# Patient Record
Sex: MALE | Race: WHITE
[De-identification: names, ages, dates, MRNs, and addresses within clinical notes are randomized per-mention and may not be internally consistent; named-entity substitution may affect disease eponyms.]

---

## 2018-10-15 ENCOUNTER — HOSPITAL ENCOUNTER (OUTPATIENT)
Dept: HOSPITAL 74 - JASU-SURG | Age: 48
Discharge: HOME | End: 2018-10-15
Attending: SURGERY
Payer: COMMERCIAL

## 2018-10-15 VITALS — SYSTOLIC BLOOD PRESSURE: 110 MMHG | DIASTOLIC BLOOD PRESSURE: 71 MMHG | HEART RATE: 60 BPM | TEMPERATURE: 98.4 F

## 2018-10-15 VITALS — BODY MASS INDEX: 33.7 KG/M2

## 2018-10-15 DIAGNOSIS — L72.0: Primary | ICD-10-CM

## 2018-10-15 DIAGNOSIS — E11.9: ICD-10-CM

## 2018-10-15 DIAGNOSIS — I10: ICD-10-CM

## 2018-10-15 DIAGNOSIS — Z79.84: ICD-10-CM

## 2018-10-15 PROCEDURE — 0JB40ZZ EXCISION OF RIGHT NECK SUBCUTANEOUS TISSUE AND FASCIA, OPEN APPROACH: ICD-10-PCS | Performed by: SURGERY

## 2018-10-15 NOTE — OP
DATE OF OPERATION:  10/15/2018

 

SURGICAL ATTENDING:  Ignacio Lynne MD 

 

PREOPERATIVE DIAGNOSIS:  Right posterolateral neck mass.

 

POSTOPERATIVE DIAGNOSIS:  Right posterolateral neck mass.

 

ANESTHESIA:  Local with sedation.

 

PROCEDURE:  Excision of right neck mass.

 

DESCRIPTION OF PROCEDURE:  The patient was taken into the operating room and placed

in a supine position, given IV sedation, IV antibiotics, prepped and draped in the

usual sterile fashion.  The right posterolateral neck mass was identified.  Local

anesthesia was injected.  An elliptical incision including the skin overlying the

surrounding the neck mass was excised down to subcutaneous tissues and muscle.  The

accessory nerve was identified and preserved.  Hemostasis was achieved with

electrocautery.  The wound was then closed in 2 layers.  Sterile dressings and

Dermabond were placed.  The patient was then extubated, awakened, and taken to

recovery in stable condition.  

 

Dr. Lynne, the attending surgeon, was present throughout the entire procedure.

 

 

 

IGNACIO LYNNE M.D.

 

[id number]

DD: 10/15/2018 09:56

DT: 10/15/2018 14:32

Job #:  [id number]

## 2018-10-16 NOTE — PATH
Surgical Pathology Report



Patient Name:  LISE BUNCH

Accession #:  [id number]

Med. Rec. #:  [id number]                                                        

   /Age/Gender:  1970 (Age: 48) / M

Account:  [id number]                                                          

             Location: [facility]

Taken:  10/15/2018

Received:  10/15/2018

Reported:  10/16/2018

Physicians:  Toro Felix M.D.

  



Specimen(s) Received

 RIGHT LATERAL NECK MASS 





Clinical History

Right lateral neck mass







Final Diagnosis

MASS, LATERAL NECK, RIGHT, EXCISION:

RUPTURED AND INFLAMED EPIDERMAL INCLUSION CYST.





***Electronically Signed***

Dora Parker M.D.





Gross Description

Received in formalin labeled "right lateral neck mass" is a skin ellipse and

underlying subcutaneous tissue which measures 4.5 x 2 cm, excised to a depth of

2 cm. The specimen is undesignated. Surgical margins are inked in blue. Skin

surface shows a 0.2 cm papular lesion.  Which on cut section, connects to a 1 x

0.3 cm variegated, white-tan, firm, lesion with surrounding erythema. The lesion

abuts the inked radial margin and 1.5 cm from the deep margin. The entire lesion

and tips are submitted in 6 cassettes as follows: 1-5- lesion, 6- tips.

MLSZ/10/15/2018



sanml/10/15/2018

## 2018-11-20 PROBLEM — Z00.00 ENCOUNTER FOR PREVENTIVE HEALTH EXAMINATION: Status: ACTIVE | Noted: 2018-11-20

## 2019-02-25 ENCOUNTER — RECORD ABSTRACTING (OUTPATIENT)
Age: 49
End: 2019-02-25

## 2019-02-25 DIAGNOSIS — F17.200 NICOTINE DEPENDENCE, UNSPECIFIED, UNCOMPLICATED: ICD-10-CM

## 2019-02-25 DIAGNOSIS — Z87.442 PERSONAL HISTORY OF URINARY CALCULI: ICD-10-CM

## 2019-02-25 DIAGNOSIS — Z86.39 PERSONAL HISTORY OF OTHER ENDOCRINE, NUTRITIONAL AND METABOLIC DISEASE: ICD-10-CM

## 2019-02-25 DIAGNOSIS — R73.03 PREDIABETES.: ICD-10-CM

## 2019-03-08 ENCOUNTER — MEDICATION RENEWAL (OUTPATIENT)
Age: 49
End: 2019-03-08

## 2019-03-19 ENCOUNTER — APPOINTMENT (OUTPATIENT)
Dept: ENDOCRINOLOGY | Facility: CLINIC | Age: 49
End: 2019-03-19

## 2019-05-21 ENCOUNTER — APPOINTMENT (OUTPATIENT)
Dept: ENDOCRINOLOGY | Facility: CLINIC | Age: 49
End: 2019-05-21
Payer: COMMERCIAL

## 2019-05-21 VITALS
HEART RATE: 70 BPM | WEIGHT: 245 LBS | BODY MASS INDEX: 34.3 KG/M2 | SYSTOLIC BLOOD PRESSURE: 120 MMHG | HEIGHT: 71 IN | DIASTOLIC BLOOD PRESSURE: 78 MMHG

## 2019-05-21 PROCEDURE — 99213 OFFICE O/P EST LOW 20 MIN: CPT

## 2019-05-22 NOTE — HISTORY OF PRESENT ILLNESS
[FreeTextEntry1] : \par May 21, 2019\par \par PCP: Dr. Meng Ro: [facility] \par             [address], [place] [address]\par             [phone] Fax \par            .\par             Cardiology: Dr. Bo Mendez p [phone]\par             f [phone]\par             and now Dr. Regan Hamilton p  \par             f: [phone]\par             on [facility]. \par             Eyes: Dr. Amado Curran p \par             f  \par             Pod: Dr. Mitesh Vera on [address]  p\par             sees every 6 weeks\par             ENT: Dr. Mekhi Dhillon 9/20/18 R neck mass \par            .\par            CC: Diabetes ~ 2015 A1c 7.1-7.6 % (did not tolerate glipizide)\par             (born with decreased hearing) \par             MI: 2/4/2016 - presented with sweating and weakness -> SJR\par             -smokes 2 cigars/week \par             Elevated PTH (167 2/20/18, 25 OH vit D low) \par             , borderline elev calcium Hx kidney stone\par            .\par Brings his One Touch Verio.  He checks mostly fasting BS a few times a week, a few after noon BS (much higher).\par I gave him CGM:  Freestyle Lily 14 which he was able to use for 11 of 14 days.    He generally checked his sugar  once, occasionally twice a day even with the Lily, so there are gaps in the graphical information  On the 11th day of 14 the Lily got knocked off and he said he does not want to use it going into the future.   However, the Lily nicely documented marked excursion in his blood sugar after meals, a time missed on the fingerstick data.  \par \par August - Sept 4 he will be on a "boatel" in [place].   \par ROV here in October.   \par \par He reports that Dr. Malin would like him to try an SGLT-2 inhibitor, Jardiance. Requested and he has been instructed in its use, warned of potential side effects and importance of extra fluid intake.   \par \par \par \par \par \par \par Previous notes from [employer] appended below.\par \par November 12, 2018\par            .\par            PCP: Dr. Meng Ro: [facility] \par             [address], [place] [address]\par             [phone] Fax \par            .\par             Cardiology: Dr. Bo Mendez p [phone]\par             f [phone]\par             and now Dr. Regan Hamilton p  \par             f: [phone]\par             on [facility]. \par             Eyes: Dr. Amado Curran p \par             f  \par             Pod: Dr. Mitesh Vera on [address]  p\par             sees every 6 weeks\par             ENT: Dr. Mekhi Dhillon 9/20/18 R neck mass \par            .\par            CC: Diabetes ~ 2015 A1c 7.1-7.6 % (did not claude glipizide)\par             (born with decreased hearing) \par             MI: 2/4/2016 - presented with sweating and weakness -> SJR\par             -smokes 2 cigars/week \par             Elevated PTH (167 2/20/18, 25 OH vit D low) \par             , borderline elev calcium Hx kidney stone\par            .\par            49 yo with decreased hearing visits for diabetes, low vitamin D,\par            early hyperparathyroidism.\par            Just returned from a trip to [place].\par            Did a lot of fishing.\par            [place]s [place] [employer] [place].\par            Forget his blood glucose meter today. \par            .\par            lab tests from\par            6/29/2018 (last visit) included\par            25 OH vit D 19.9\par            ionized calcium 1.34 (1.12 - 1/30)\par            total calcium 10.0\par            glucose 142 mg/dl \par            HbA1c 6.2 % (had been 6.3 in February)\par            phos 3.5\par            .\par            Good eye report from January - Dr. Farr.\par            .\par            September saw Dr. Tariq Dhillon for R neck mass (infectious)\par            .\par            Medications include\par            Plavix 75 mg\par            glyburide-metformin 2l5 500 BID\par            lisinopril 5 mg daily\par            metoprolol 25 mg BID\par            simvastain 40 mg daily\par            .\par            FBS today 186 after pizza last night. \par            Rest of the Verio readings are mostly reasonable.\par            .\par            Plan: Updated labs today.\par            Discussed Freestyle Lily 14 sensors and reader and he will inquire at his local [facility].\par            Take OTC vitamin D 1000 iu daily.\par            Bring his wife to next visiti in March\par            ==\par

## 2019-05-22 NOTE — ASSESSMENT
[FreeTextEntry1] : ~\par Fingerstick blood sugar data from fasting state in reasonable range.\par The occasional afternoon sugars are much higher.\par He did not like using the Freestyle Lily, but for 11 days some useful data was obtained.\par He will be in [place] for a good period of time on a "boatel".\par Will see him on his return.

## 2019-10-14 ENCOUNTER — APPOINTMENT (OUTPATIENT)
Dept: ENDOCRINOLOGY | Facility: CLINIC | Age: 49
End: 2019-10-14
Payer: COMMERCIAL

## 2019-10-14 VITALS
SYSTOLIC BLOOD PRESSURE: 120 MMHG | WEIGHT: 238 LBS | DIASTOLIC BLOOD PRESSURE: 80 MMHG | HEART RATE: 57 BPM | HEIGHT: 71 IN | BODY MASS INDEX: 33.32 KG/M2

## 2019-10-14 PROCEDURE — 36415 COLL VENOUS BLD VENIPUNCTURE: CPT

## 2019-10-14 PROCEDURE — 99214 OFFICE O/P EST MOD 30 MIN: CPT | Mod: 25

## 2019-10-14 RX ORDER — GLIPIZIDE 10 MG/1
10 TABLET, FILM COATED, EXTENDED RELEASE ORAL
Refills: 0 | Status: DISCONTINUED | COMMUNITY
End: 2019-10-14

## 2019-10-14 NOTE — HISTORY OF PRESENT ILLNESS
[FreeTextEntry1] : Oct 14, 2019\par \par PCP: Dr. Meng Ro: [facility] \par             [address], [place] [address]   [phone] Fax \par            .\par             Cardiology: Dr. Bo Mendez p [phone]    f [phone]\par             and now Dr. Regan Hamilton p   f: [phone]  on [facility]. \par             Eyes: Dr. Amado Curran p       f  \par             Pod: Dr. Mitesh Vera on [address]  p   sees every 6 weeks\par             ENT: Dr. Mekhi Dhillon 9/20/18 R neck mass \par            .\par            CC: Diabetes ~ 2015 A1c 7.1-7.6 % (did not tolerate glipizide)   11/12/2018 A1c 6.9%\par             (born with decreased hearing) \par             MI: 2/4/2016 - presented with sweating and weakness -> SJR\par             -smokes 2 cigars/week \par             Elevated PTH (167 2/20/18, 25 OH vit D low = 17.9 11/12/2018  ) \par             , borderline elev calcium Hx kidney stone\par            .\par "I had too much to eat yesterday, bar-b-cue....\par My wife would like me to get the Freestyle Lily to monitor the dog...."\par He previously tried the Lily and returned it as he did not like it.\par Briings in the One Touch Verio \par FBS today 180 mg/dl\par yesterday \par day before FBS 80\par July 27 5 pm 188 mg/dl   \par \par Taking Jardiance.\par Not sure of other medications.\par Recent labs by Dr. Contreras.  \par \par \par May 21, 2019\par \par PCP: Dr. Meng Ro: [facility] \par             [address], [place] [address]\par             [phone] Fax \par            .\par             Cardiology: Dr. Bo Mnedez p [phone]\par             f [phone]\par             and now Dr. Regan Hamilton p  \par             f: [phone]\par             on [facility]. \par             Eyes: Dr. Amado Curran p \par             f  \par             Pod: Dr. Mitesh Vera on [address]  p\par             sees every 6 weeks\par             ENT: Dr. Mekhi Dhillon 9/20/18 R neck mass \par            .\par            CC: Diabetes ~ 2015 A1c 7.1-7.6 % (did not tolerate glipizide)\par             (born with decreased hearing) \par             MI: 2/4/2016 - presented with sweating and weakness -> SJR\par             -smokes 2 cigars/week \par             Elevated PTH (167 2/20/18, 25 OH vit D low) \par             , borderline elev calcium Hx kidney stone\par            .\par Brings his One Touch Verio.  He checks mostly fasting BS a few times a week, a few after noon BS (much higher).\par I gave him CGM:  Freestyle Lily 14 which he was able to use for 11 of 14 days.    He generally checked his sugar  once, occasionally twice a day even with the Lily, so there are gaps in the graphical information  On the 11th day of 14 the Lily got knocked off and he said he does not want to use it going into the future.   However, the Lily nicely documented marked excursion in his blood sugar after meals, a time missed on the fingerstick data.  \par \par August - Sept 4 he will be on a "boatel" in [place].   \par ROV here in October.   \par \par He reports that Dr. Malin would like him to try an SGLT-2 inhibitor, Jardiance. Requested and he has been instructed in its use, warned of potential side effects and importance of extra fluid intake.   \par \par \par \par \par \par \par Previous notes from [employer] appended below.\par \par November 12, 2018\par            .\par            PCP: Dr. Meng Ro: [facility] \par             [address], [place] [address]\par             [phone] Fax \par            .\par             Cardiology: Dr. Bo Mendez p [phone]\par             f [phone]\par             and now Dr. Regan Hamilton p  \par             f: [phone]\par             on [facility]. \par             Eyes: Dr. Amado Curran p \par             f  \par             Pod: Dr. Mitesh Vera on [address]  p\par             sees every 6 weeks\par             ENT: Dr. Mekhi Dhillon 9/20/18 R neck mass \par            .\par            CC: Diabetes ~ 2015 A1c 7.1-7.6 % (did not claude glipizide)\par             (born with decreased hearing) \par             MI: 2/4/2016 - presented with sweating and weakness -> SJR\par             -smokes 2 cigars/week \par             Elevated PTH (167 2/20/18, 25 OH vit D low) \par             , borderline elev calcium Hx kidney stone\par            .\par            49 yo with decreased hearing visits for diabetes, low vitamin D,\par            early hyperparathyroidism.\par            Just returned from a trip to [place].\par            Did a lot of fishing.\par            [place] [employer] [place].\par            Forget his blood glucose meter today. \par            .\par            lab tests from\par            6/29/2018 (last visit) included\par            25 OH vit D 19.9\par            ionized calcium 1.34 (1.12 - 1/30)\par            total calcium 10.0\par            glucose 142 mg/dl \par            HbA1c 6.2 % (had been 6.3 in February)\par            phos 3.5\par            .\par            Good eye report from January - Dr. Farr.\par            .\par            September saw Dr. Tariq Dhillon for R neck mass (infectious)\par            .\par            Medications include\par            Plavix 75 mg\par            glyburide-metformin 2l5 500 BID\par            lisinopril 5 mg daily\par            metoprolol 25 mg BID\par            simvastain 40 mg daily\par            .\par            FBS today 186 after pizza last night. \par            Rest of the Verio readings are mostly reasonable.\par            .\par            Plan: Updated labs today.\par            Discussed Freestyle Lily 14 sensors and reader and he will [facility].\par            Take OTC vitamin D 1000 iu daily.\par            Bring his wife to next visiti in March\par            ==\par

## 2019-10-14 NOTE — ASSESSMENT
[FreeTextEntry1] : &\par Updated calcium, PTH, vit D and A1c today\par ROV February\par Bring all medications to next visit.\par Bring his wife to next visit.\par Do testing after meals.\par Has not been to ophthalmology yet this year - reminded.

## 2019-10-19 LAB
25(OH)D3 SERPL-MCNC: 26.6 NG/ML
ANION GAP SERPL CALC-SCNC: 13 MMOL/L
BUN SERPL-MCNC: 18 MG/DL
CALCIUM SERPL-MCNC: 10.1 MG/DL
CALCIUM SERPL-MCNC: 10.1 MG/DL
CHLORIDE SERPL-SCNC: 106 MMOL/L
CO2 SERPL-SCNC: 25 MMOL/L
CREAT SERPL-MCNC: 0.79 MG/DL
ESTIMATED AVERAGE GLUCOSE: 137 MG/DL
FRUCTOSAMINE SERPL-MCNC: 246 UMOL/L
GLUCOSE SERPL-MCNC: 153 MG/DL
HBA1C MFR BLD HPLC: 6.4 %
PARATHYROID HORMONE INTACT: 66 PG/ML
POTASSIUM SERPL-SCNC: 4.2 MMOL/L
SODIUM SERPL-SCNC: 144 MMOL/L

## 2019-10-31 ENCOUNTER — RX RENEWAL (OUTPATIENT)
Age: 49
End: 2019-10-31

## 2019-11-02 ENCOUNTER — RX RENEWAL (OUTPATIENT)
Age: 49
End: 2019-11-02

## 2019-11-09 ENCOUNTER — RX RENEWAL (OUTPATIENT)
Age: 49
End: 2019-11-09

## 2019-11-19 ENCOUNTER — RX RENEWAL (OUTPATIENT)
Age: 49
End: 2019-11-19

## 2020-02-18 ENCOUNTER — APPOINTMENT (OUTPATIENT)
Dept: ENDOCRINOLOGY | Facility: CLINIC | Age: 50
End: 2020-02-18
Payer: COMMERCIAL

## 2020-02-18 VITALS
OXYGEN SATURATION: 97 % | WEIGHT: 229 LBS | HEART RATE: 58 BPM | BODY MASS INDEX: 32.06 KG/M2 | SYSTOLIC BLOOD PRESSURE: 100 MMHG | HEIGHT: 71 IN | DIASTOLIC BLOOD PRESSURE: 70 MMHG

## 2020-02-18 DIAGNOSIS — D64.9 ANEMIA, UNSPECIFIED: ICD-10-CM

## 2020-02-18 PROCEDURE — 36415 COLL VENOUS BLD VENIPUNCTURE: CPT

## 2020-02-18 PROCEDURE — 99214 OFFICE O/P EST MOD 30 MIN: CPT | Mod: 25

## 2020-02-18 NOTE — PHYSICAL EXAM
[Alert] : alert [No Acute Distress] : no acute distress [Well Nourished] : well nourished [Well Developed] : well developed [Healthy Appearance] : healthy appearance [Normal Voice/Communication] : normal voice communication [EOMI] : extra ocular movement intact [No Proptosis] : no proptosis [Normal Outer Ear/Nose] : the ears and nose were normal in appearance [Thyroid Not Enlarged] : the thyroid was not enlarged [No Thyroid Nodules] : there were no palpable thyroid nodules [No Respiratory Distress] : no respiratory distress [No Accessory Muscle Use] : no accessory muscle use [Clear to Auscultation] : lungs were clear to auscultation bilaterally [Normal Rate] : heart rate was normal  [Normal S1, S2] : normal S1 and S2 [Regular Rhythm] : with a regular rhythm [Pedal Pulses Normal] : the pedal pulses are present [No Edema] : there was no peripheral edema [Not Distended] : not distended [Post Cervical Nodes] : posterior cervical nodes [No Spinal Tenderness] : no spinal tenderness [Spine Straight] : spine straight [No Stigmata of Cushings Syndrome] : no stigmata of cushings syndrome [Normal Gait] : normal gait [Normal Strength/Tone] : muscle strength and tone were normal [No Tremors] : no tremors [Oriented x3] : oriented to person, place, and time [Normal Insight/Judgement] : insight and judgment were intact [Normal Affect] : the affect was normal [Normal Mood] : the mood was normal [Acanthosis Nigricans] : no acanthosis nigricans

## 2020-02-18 NOTE — HISTORY OF PRESENT ILLNESS
[FreeTextEntry1] : Feb 18, 2020\par \par PCP: Dr. Meng Ro: [facility] \par             [address], [place] [address]   [phone] Fax \par            .\par             Cardiology: Dr. Bo Mendez p [phone]    f [phone]\par             and now Dr. Regan Hamilton p   f: [phone]  on [facility]. \par             Eyes: Dr. Amado Curran p       f  \par             Pod: Dr. Mitesh Vera on [address]  p   sees every 6 weeks\par             ENT: Dr. Mekhi Dhillon 9/20/18 R neck mass - removed - apparently benign  \par            .\par            CC: Diabetes ~ 2015 A1c 7.1-7.6 % (did not tolerate glipizide)   11/12/2018 A1c 6.9%  10/19/19 A1c 6.4 %\par             (born with decreased hearing) \par             MI: 2/4/2016 - presented with sweating and weakness -> SJR\par             -smokes 2 cigars/week \par             Elevated PTH (167 2/20/18, 25 OH vit D low = 17.9 11/12/2018  ) \par             , borderline elev calcium Hx kidney stone\par \par 50 yo visits for diabetes.  \par \par He is taking daily OTC vitamin D\par Jardiance  10 mg daily  (lost 10 lbs after starting it).\par glyburide-metformin 2.5/500 once a day in AM  \par            .\par \par He brings in the OneTouch Verio  \par FBS run around 120\par \par Impression:  A1c trajectory has been one of improvement over time.\par Will check A1c again today.\par He might benefit from increasing the glyb-met to BID\par ROV in June.  \par \par Oct 14, 2019\par \par PCP: Dr. Meng Ro: [facility] \par             [address], [place] [address]   [phone] Fax \par            .\par             Cardiology: Dr. Bo Mendez p [phone]    f [phone]\par             and now Dr. Regan Hamilton p   f: [phone]  on [facility]. \par             Eyes: Dr. Amado Curran p       f  \par             Pod: Dr. Mitesh Vera on [address]  p   sees every 6 weeks\par             ENT: Dr. Mekhi Dhillon 9/20/18 R neck mass \par            .\par            CC: Diabetes ~ 2015 A1c 7.1-7.6 % (did not tolerate glipizide)   11/12/2018 A1c 6.9%\par             (born with decreased hearing) \par             MI: 2/4/2016 - presented with sweating and weakness -> SJR\par             -smokes 2 cigars/week \par             Elevated PTH (167 2/20/18, 25 OH vit D low = 17.9 11/12/2018  ) \par             , borderline elev calcium Hx kidney stone\par            .\par "I had too much to eat yesterday, bar-b-cue....\par My wife would like me to get the Freestyle Lily to monitor the dog...."\par He previously tried the Lily and returned it as he did not like it.\par Briings in the One Touch Verio \par FBS today 180 mg/dl\par yesterday \par day before FBS 80\par July 27 5 pm 188 mg/dl   \par \par Taking Jardiance.\par Not sure of other medications.\par Recent labs by Dr. Contreras.  \par \par \par May 21, 2019\par \par PCP: Dr. Meng Ro: [facility] \par             [address], [place] [address]\par             [phone] Fax \par            .\par             Cardiology: Dr. Bo Mendez p [phone]\par             f [phone]\par             and now Dr. Regan Hamilton p  \par             f: [phone]\par             on [address] [place]. \par             Eyes: Dr. Amado Curran p \par             f  \par             Pod: Dr. Mitesh Vera on [address]  p\par             sees every 6 weeks\par             ENT: Dr. Mekhi Dhillon 9/20/18 R neck mass \par            .\par            CC: Diabetes ~ 2015 A1c 7.1-7.6 % (did not tolerate glipizide)\par             (born with decreased hearing) \par             MI: 2/4/2016 - presented with sweating and weakness -> SJR\par             -smokes 2 cigars/week \par             Elevated PTH (167 2/20/18, 25 OH vit D low) \par             , borderline elev calcium Hx kidney stone\par            .\par Brings his One Touch Verio.  He checks mostly fasting BS a few times a week, a few after noon BS (much higher).\par I gave him CGM:  Freestyle Lily 14 which he was able to use for 11 of 14 days.    He generally checked his sugar  once, occasionally twice a day even with the Lily, so there are gaps in the graphical information  On the 11th day of 14 the Lily got knocked off and he said he does not want to use it going into the future.   However, the Lily nicely documented marked excursion in his blood sugar after meals, a time missed on the fingerstick data.  \par \par August - Sept 4 he will be on a "boatel" in [place].   \par ROV here in October.   \par \par He reports that Dr. Malin would like him to try an SGLT-2 inhibitor, Jardiance. Requested and he has been instructed in its use, warned of potential side effects and importance of extra fluid intake.   \par \par \par \par \par \par \par Previous notes from [employer] appended below.\par \par November 12, 2018\par            .\par            PCP: Dr. Meng Ro: [facility] \par             [address], [place] [address]\par             [phone] Fax \par            .\par             Cardiology: Dr. Bo Mendez p [phone]\par             f [phone]\par             and now Dr. Regan Hamilton p  \par             f: [phone]\par             on [facility]. \par             Eyes: Dr. Amado Curran p \par             f  \par             Pod: Dr. Mitesh Vera on [address]  p\par             sees every 6 weeks\par             ENT: Dr. Mekhi Dhillon 9/20/18 R neck mass \par            .\par            CC: Diabetes ~ 2015 A1c 7.1-7.6 % (did not claude glipizide)\par             (born with decreased hearing) \par             MI: 2/4/2016 - presented with sweating and weakness -> SJR\par             -smokes 2 cigars/week \par             Elevated PTH (167 2/20/18, 25 OH vit D low) \par             , borderline elev calcium Hx kidney stone\par            .\par            47 yo with decreased hearing visits for diabetes, low vitamin D,\par            early hyperparathyroidism.\par            Just returned from a trip to [place].\par            Did a lot of fishing.\par            [place] [employer] [place].\par            Forget his blood glucose meter today. \par            .\par            lab tests from\par            6/29/2018 (last visit) included\par            25 OH vit D 19.9\par            ionized calcium 1.34 (1.12 - 1/30)\par            total calcium 10.0\par            glucose 142 mg/dl \par            HbA1c 6.2 % (had been 6.3 in February)\par            phos 3.5\par            .\par            Good eye report from January - Dr. Farr.\par            .\par            September saw Dr. Tariq Dhillon for R neck mass (infectious)\par            .\par            Medications include\par            Plavix 75 mg\par            glyburide-metformin 2l5 500 BID\par            lisinopril 5 mg daily\par            metoprolol 25 mg BID\par            simvastain 40 mg daily\par            .\par            FBS today 186 after pizza last night. \par            Rest of the Verio readings are mostly reasonable.\par            .\par            Plan: Updated labs today.\par            Discussed Freestyle Lliy 14 sensors and reader and he [facility].\par            Take OTC vitamin D 1000 iu daily.\par            Bring his wife to next visiti in March\par            ==\par

## 2020-02-23 LAB
ALBUMIN SERPL ELPH-MCNC: 5 G/DL
ALP BLD-CCNC: 84 U/L
ALT SERPL-CCNC: 23 U/L
ANION GAP SERPL CALC-SCNC: 16 MMOL/L
AST SERPL-CCNC: 19 U/L
BASOPHILS # BLD AUTO: 0.04 K/UL
BASOPHILS NFR BLD AUTO: 0.5 %
BILIRUB DIRECT SERPL-MCNC: 0.1 MG/DL
BILIRUB INDIRECT SERPL-MCNC: 0.3 MG/DL
BILIRUB SERPL-MCNC: 0.4 MG/DL
BUN SERPL-MCNC: 10 MG/DL
CALCIUM SERPL-MCNC: 11 MG/DL
CHLORIDE SERPL-SCNC: 103 MMOL/L
CHOLEST SERPL-MCNC: 159 MG/DL
CHOLEST/HDLC SERPL: 4 RATIO
CO2 SERPL-SCNC: 26 MMOL/L
CREAT SERPL-MCNC: 0.78 MG/DL
EOSINOPHIL # BLD AUTO: 0.06 K/UL
EOSINOPHIL NFR BLD AUTO: 0.8 %
ESTIMATED AVERAGE GLUCOSE: 131 MG/DL
GLUCOSE SERPL-MCNC: 95 MG/DL
HBA1C MFR BLD HPLC: 6.2 %
HCT VFR BLD CALC: 51.7 %
HDLC SERPL-MCNC: 40 MG/DL
HGB BLD-MCNC: 16.7 G/DL
IMM GRANULOCYTES NFR BLD AUTO: 0.3 %
LDLC SERPL CALC-MCNC: 88 MG/DL
LYMPHOCYTES # BLD AUTO: 2.42 K/UL
LYMPHOCYTES NFR BLD AUTO: 30.6 %
MAN DIFF?: NORMAL
MCHC RBC-ENTMCNC: 30.5 PG
MCHC RBC-ENTMCNC: 32.3 GM/DL
MCV RBC AUTO: 94.3 FL
MONOCYTES # BLD AUTO: 0.63 K/UL
MONOCYTES NFR BLD AUTO: 8 %
NEUTROPHILS # BLD AUTO: 4.73 K/UL
NEUTROPHILS NFR BLD AUTO: 59.8 %
PHOSPHATE SERPL-MCNC: 4.1 MG/DL
PLATELET # BLD AUTO: 218 K/UL
POTASSIUM SERPL-SCNC: 4.5 MMOL/L
PROT SERPL-MCNC: 7.1 G/DL
RBC # BLD: 5.48 M/UL
RBC # FLD: 13 %
SODIUM SERPL-SCNC: 144 MMOL/L
TRIGL SERPL-MCNC: 161 MG/DL
WBC # FLD AUTO: 7.9 K/UL

## 2020-05-06 ENCOUNTER — APPOINTMENT (OUTPATIENT)
Dept: ENDOCRINOLOGY | Facility: CLINIC | Age: 50
End: 2020-05-06
Payer: COMMERCIAL

## 2020-05-06 PROCEDURE — 99443: CPT

## 2020-05-09 NOTE — ASSESSMENT
[FreeTextEntry1] : Feels well.\par Reports BS in good range w/o hypoglycemia.\par Elevated calcium requires f/u\par ROV 3 months.

## 2020-05-09 NOTE — HISTORY OF PRESENT ILLNESS
[Verbal consent obtained from patient] : the patient, [unfilled] [FreeTextEntry1] : May 06, 2020    Telephone Visit, including his wife - her cell  (VideoChat failed)\par \par PCP: Dr. Meng Ro: [facility] \par             [address], [place] [address]   [phone] Fax \par            .\par             Cardiology: Dr. Bo Mendez p [phone]    f [phone]\par             and now Dr. Regan Hamilton p   f: [phone]  on [facility]. \par             Eyes: Dr. Amado Curran p       f  \par             Pod: Dr. Mitesh Vera on [address]  p   sees every 6 weeks\par             ENT: Dr. Mekhi Dhillon 9/20/18 R neck mass - removed - apparently benign  \par            .\par            CC: Diabetes ~ 2015 A1c 7.1-7.6 % (did not tolerate glipizide)   11/12/2018 A1c 6.9%  10/19/19 A1c 6.4 %\par             (born with decreased hearing) \par             MI: 2/4/2016 - presented with sweating and weakness -> SJR\par             -smokes 2 cigars/week \par             Elevated PTH (167 2/20/18, 25 OH vit D low = 17.9 11/12/2018  ) \par             , borderline elev calcium Hx kidney stone\par \par 48 yo visits for diabetes and hypercalcemia.   Feb 18 calcium 11.0 (had been 10.1 in October)\par Previous history of low vit D and elevated PTH.  \par Likely has hyperparathyroidism.\par \par He reports BS in good range on\par Jardiance and \par glyburide-metformin and needs refills.\par Advised to return to office within 3 months.  \par His wife tested positive for Covid 19 x 2, now awaits abs.   No one else in 5 person household positive.\par \par He says BS in good range.\par [facility] and\par glyburide 1.25 - metformin from mail order.\par \par No hypoglycemia.  \par He will return in 3 months.  \par \par \par Feb 18, 2020\par \par PCP: Dr. Meng Ro: [facility] \par             [address], [place] [address]   [phone] Fax \par            .\par             Cardiology: Dr. Bo Mendez p [phone]    f [phone]\par             and now Dr. Regan Hamilton p   f: [phone]  on [facility]. \par             Eyes: Dr. Amado Curran p       f  \par             Pod: Dr. Mitesh Vera on [address]  p   sees every 6 weeks\par             ENT: Dr. Mekhi Dhillon 9/20/18 R neck mass - removed - apparently benign  \par            .\par            CC: Diabetes ~ 2015 A1c 7.1-7.6 % (did not tolerate glipizide)   11/12/2018 A1c 6.9%  10/19/19 A1c 6.4 %\par             (born with decreased hearing) \par             MI: 2/4/2016 - presented with sweating and weakness -> SJR\par             -smokes 2 cigars/week \par             Elevated PTH (167 2/20/18, 25 OH vit D low = 17.9 11/12/2018  ) \par             , borderline elev calcium Hx kidney stone\par \par 48 yo visits for diabetes.  \par \par He is taking daily OTC vitamin D\par Jardiance  10 mg daily  (lost 10 lbs after starting it).\par glyburide-metformin 2.5/500 once a day in AM  \par            .\par \par He brings in the OneTouch Verio  \par FBS run around 120\par \par Impression:  A1c trajectory has been one of improvement over time.\par Will check A1c again today.\par He might benefit from increasing the glyb-met to BID\par ROV in June.  \par \par Oct 14, 2019\par \par PCP: Dr. Meng Ro: [facility] \par             [address], [place] [address]   [phone] Fax \par            .\par             Cardiology: Dr. Bo Mendez p [phone]    f [phone]\par             and now Dr. Regan Hamilton p   f: [phone]  on [facility]. \par             Eyes: Dr. Amado Curran p       f  \par             Pod: Dr. Mitesh Vera on [address]  p   sees every 6 weeks\par             ENT: Dr. Mekhi Dhillon 9/20/18 R neck mass \par            .\par            CC: Diabetes ~ 2015 A1c 7.1-7.6 % (did not tolerate glipizide)   11/12/2018 A1c 6.9%\par             (born with decreased hearing) \par             MI: 2/4/2016 - presented with sweating and weakness -> SJR\par             -smokes 2 cigars/week \par             Elevated PTH (167 2/20/18, 25 OH vit D low = 17.9 11/12/2018  ) \par             , borderline elev calcium Hx kidney stone\par            .\par "I had too much to eat yesterday, bar-b-cue....\par My wife would like me to get the Freestyle Lily to monitor the dog...."\par He previously tried the Lily and returned it as he did not like it.\par Briings in the One Touch Verio \par FBS today 180 mg/dl\par yesterday \par day before FBS 80\par July 27 5 pm 188 mg/dl   \par \par Taking Jardiance.\par Not sure of other medications.\par Recent labs by Dr. Contreras.  \par \par \par May 21, 2019\par \par PCP: Dr. Meng Ro: [facility] \par             [address], [place] [address]\par             [phone] Fax \par            .\par             Cardiology: Dr. Bo Mendez p [phone]\par             f [phone]\par             and now Dr. Regan Hamilton p  \par             f: [phone]\par             on [address]. \par             Eyes: Dr. Amado Curran p \par             f  \par             Pod: Dr. Mitesh Vera on [address]  p\par             sees every 6 weeks\par             ENT: Dr. Mekhi Dhillon 9/20/18 R neck mass \par            .\par            CC: Diabetes ~ 2015 A1c 7.1-7.6 % (did not tolerate glipizide)\par             (born with decreased hearing) \par             MI: 2/4/2016 - presented with sweating and weakness -> SJR\par             -smokes 2 cigars/week \par             Elevated PTH (167 2/20/18, 25 OH vit D low) \par             , borderline elev calcium Hx kidney stone\par            .\par Brings his One Touch Verio.  He checks mostly fasting BS a few times a week, a few after noon BS (much higher).\par I gave him CGM:  Freestyle Lily 14 which he was able to use for 11 of 14 days.    He generally checked his sugar  once, occasionally twice a day even with the Lily, so there are gaps in the graphical information  On the 11th day of 14 the Lily got knocked off and he said he does not want to use it going into the future.   However, the Lily nicely documented marked excursion in his blood sugar after meals, a time missed on the fingerstick data.  \par \par August - Sept 4 he will be on a "boatel" in [place].   \par ROV here in October.   \par \par He reports that Dr. Malin would like him to try an SGLT-2 inhibitor, Jardiance. Requested and he has been instructed in its use, warned of potential side effects and importance of extra fluid intake.   \par \par \par \par \par \par \par Previous notes from [employer] appended below.\par \par November 12, 2018\par            .\par            PCP: Dr. Meng Ro: [facility] \par             [address], [place] [address]\par             [phone] Fax \par            .\par             Cardiology: Dr. Bo Mendez p [phone]\par             f [phone]\par             and now Dr. Regan Hamilton p  \par             f: [phone]\par             on [facility]. \par             Eyes: Dr. Amado Curran p \par             f  \par             Pod: Dr. Mitesh Vera on [address]  p\par             sees every 6 weeks\par             ENT: Dr. Mekhi Dhillon 9/20/18 R neck mass \par            .\par            CC: Diabetes ~ 2015 A1c 7.1-7.6 % (did not claude glipizide)\par             (born with decreased hearing) \par             MI: 2/4/2016 - presented with sweating and weakness -> SJR\par             -smokes 2 cigars/week \par             Elevated PTH (167 2/20/18, 25 OH vit D low) \par             , borderline elev calcium Hx kidney stone\par            .\par            49 yo with decreased hearing visits for diabetes, low vitamin D,\par            early hyperparathyroidism.\par            Just returned from a trip to [place].\par            Did a lot of fishing.\par            Knows the [employer].\par            Forget his blood glucose meter today. \par            .\par            lab tests from\par            6/29/2018 (last visit) included\par            25 OH vit D 19.9\par            ionized calcium 1.34 (1.12 - 1/30)\par            total calcium 10.0\par            glucose 142 mg/dl \par            HbA1c 6.2 % (had been 6.3 in February)\par            phos 3.5\par            .\par            Good eye report from January - Dr. Farr.\par            .\par            September saw Dr. Tariq Dhillon for R neck mass (infectious)\par            .\par            Medications include\par            Plavix 75 mg\par            glyburide-metformin 2l5 500 BID\par            lisinopril 5 mg daily\par            metoprolol 25 mg BID\par            simvastain 40 mg daily\par            .\par            FBS today 186 after pizza last night. \par            Rest of the Verio readings are mostly reasonable.\par            .\par            Plan: Updated labs today.\par            Discussed Freestyle Lily 14 sensors and reader and [facility].\par            Take OTC vitamin D 1000 iu daily.\par            Bring his wife to next visiti in March\par            ==\par

## 2020-07-01 ENCOUNTER — APPOINTMENT (OUTPATIENT)
Dept: ENDOCRINOLOGY | Facility: CLINIC | Age: 50
End: 2020-07-01

## 2020-11-10 ENCOUNTER — APPOINTMENT (OUTPATIENT)
Dept: ENDOCRINOLOGY | Facility: CLINIC | Age: 50
End: 2020-11-10
Payer: COMMERCIAL

## 2020-11-10 VITALS
OXYGEN SATURATION: 98 % | HEART RATE: 65 BPM | BODY MASS INDEX: 32.9 KG/M2 | WEIGHT: 235 LBS | DIASTOLIC BLOOD PRESSURE: 80 MMHG | SYSTOLIC BLOOD PRESSURE: 135 MMHG | HEIGHT: 71 IN

## 2020-11-10 PROCEDURE — 99214 OFFICE O/P EST MOD 30 MIN: CPT

## 2020-11-10 PROCEDURE — 99072 ADDL SUPL MATRL&STAF TM PHE: CPT

## 2020-11-13 NOTE — QUALITY MEASURES
[Visual inspection, sensory exam] : Foot exam, including visual inspection, sensory exam with mono filament, and pulse exam, was performed within the last 12 months own pcp

## 2020-11-13 NOTE — HISTORY OF PRESENT ILLNESS
[FreeTextEntry1] : Nov 10, 2020   in person\par \par PCP: Dr. Meng Ro: [facility] \par             [address], [place] [address]   [phone] Fax \par            .\par             Cardiology: Dr. Bo Mendez p [phone]    f [phone]\par             and now Dr. Regan Hamilton p   f: [phone]  on [facility]. \par             Eyes: Dr. Amado Curran p       f  \par             Pod: Dr. Mitesh Vera on [address]  p   sees every 6 weeks\par             ENT: Dr. 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  Highest sugar 165 \par History of low vitamin D - taking 400 iu BID per his recollection\par \par \par \par Most recent A1c:  Oct 2019  6.4,  Feb 2020 6.2\par Reports he is taking vitamin D    \par \par Examination: \par Constitutional:  Alert, well nourished, healthy appearance, no acute distress \par Eyes:  No proptosis, no lid lag\par Thyroid:\par Pulmonary:  No respiratory distress, no accessory muscle use; normal rate and effort\par Cardiac:  Normal rate\par Vascular: \par Endocrine:  No stigmata of Cushing’s Syndrome\par Musculoskeletal:  Normal gait, no involuntary movements\par Neurology:  No tremors\par Affect/Mood/Psych:  Oriented x 3; normal affect, normal insight/judgement, normal mood \par .\par  \par \par \par Impression:  Doing very well.\par Denies hypoglycemia.\par No recent kidney stones.\par \par Plan:  Same Rx and ROV 3-4 months\par Continue Jardiance 10 mg daaily \par glyburide-metformin 1. in PM\par \par \par May 06, 2020    Telephone Visit, including his wife - her cell  (VideoChat failed)\par \par PCP: Dr. Meng Ro: [facility] \par             [address], [place] [address]   [phone] Fax \par            .\par             Cardiology: Dr. Bo Mendez p [phone]    f [phone]\par             and now Dr. Regan Hamilton p   f: [phone]  on [facility]. \par             Eyes: Dr. Amado Curran p       f  \par             Pod: Dr. Mitesh Vera on [address]  p   sees every 6 weeks\par             ENT: Dr. Mekhi Dhillon 9/20/18 R neck mass - removed - apparently benign  \par            .\par            CC: Diabetes ~ 2015 A1c 7.1-7.6 % (did not tolerate glipizide)   11/12/2018 A1c 6.9%  10/19/19 A1c 6.4 %\par             (born with decreased hearing) \par             MI: 2/4/2016 - presented with sweating and weakness -> SJR\par             -smokes 2 cigars/week \par             Elevated PTH (167 2/20/18, 25 OH vit D low = 17.9 11/12/2018  ) \par             , borderline elev calcium Hx kidney stone\par \par 48 yo visits for diabetes and hypercalcemia.   Feb 18 calcium 11.0 (had been 10.1 in October)\par Previous history of low vit D and elevated PTH.  \par Likely has hyperparathyroidism.\par \par He reports BS in good range on\par Jardiance and \par glyburide-metformin and needs refills.\par Advised to return to office within 3 months.  \par His wife tested positive for Covid 19 x 2, now awaits abs.   No one else in 5 person household positive.\par \par He says BS in good range.\par [facility] and\par glyburide 1.25 - metformin from mail order.\par \par No hypoglycemia.  \par He will return in 3 months.  \par \par \par Feb 18, 2020\par \par PCP: Dr. Meng Ro: [facility] \par             [address], [place] [address]   [phone] Fax \par            .\par             Cardiology: Dr. Bo Mendez p [phone]    f [phone]\par             and now Dr. Regan Hamilton p   f: [phone]  on [facility]. \par             Eyes: Dr. Amado Curran p       f  \par             Pod: Dr. Mitesh Vera on [address]  p   sees every 6 weeks\par             ENT: Dr. Mekhi Dhillon 9/20/18 R neck mass - removed - apparently benign  \par            .\par            CC: Diabetes ~ 2015 A1c 7.1-7.6 % (did not tolerate glipizide)   11/12/2018 A1c 6.9%  10/19/19 A1c 6.4 %\par             (born with decreased hearing) \par             MI: 2/4/2016 - presented with sweating and weakness -> SJR\par             -smokes 2 cigars/week \par             Elevated PTH (167 2/20/18, 25 OH vit D low = 17.9 11/12/2018  ) \par             , borderline elev calcium Hx kidney stone\par \par 48 yo visits for diabetes.  \par \par He is taking daily OTC vitamin D\par Jardiance  10 mg daily  (lost 10 lbs after starting it).\par glyburide-metformin 2.5/500 once a day in AM  \par            .\par \par He brings in the OneTouch Verio  \par FBS run around 120\par \par Impression:  A1c trajectory has been one of improvement over time.\par Will check A1c again today.\par He might benefit from increasing the glyb-met to BID\par ROV in June.  \par \par Oct 14, 2019\par \par PCP: Dr. Meng Ro: [facility] \par             [address], [place] [address]   [phone] Fax \par            .\par             Cardiology: Dr. Bo Mendez p [phone]    f [phone]\par             and now Dr. Regan Hamilton p   f: [phone]  on [facility]. \par             Eyes: Dr. Amado Curran p       f  \par             Pod: Dr. Mitesh Vera on [address]  p   sees every 6 weeks\par             ENT: Dr. Mekhi Dhillon 9/20/18 R neck mass \par            .\par            CC: Diabetes ~ 2015 A1c 7.1-7.6 % (did not tolerate glipizide)   11/12/2018 A1c 6.9%\par             (born with decreased hearing) \par             MI: 2/4/2016 - presented with sweating and weakness -> SJR\par             -smokes 2 cigars/week \par             Elevated PTH (167 2/20/18, 25 OH vit D low = 17.9 11/12/2018  ) \par             , borderline elev calcium Hx kidney stone\par            .\par "I had too much to eat yesterday, bar-b-cue....\par My wife would like me to get the Freestyle Lily to monitor the dog...."\par He previously tried the Lily and returned it as he did not like it.\par Briings in the One Touch Verio \par FBS today 180 mg/dl\par yesterday \par day before FBS 80\par July 27 5 pm 188 mg/dl   \par \par Taking Jardiance.\par Not sure of other medications.\par Recent labs by Dr. Contreras.  \par \par \par May 21, 2019\par \par PCP: Dr. Meng Ro: [facility] \par             [address], [place] [address]\par             [phone] Fax \par            .\par             Cardiology: Dr. Bo Mendez p [phone]\par             f [phone]\par             and now Dr. Regan Hamilton p  \par             f: [phone]\par             on [facility]. \par             Eyes: Dr. Amado Curran p \par             f  \par             Pod: Dr. Mitesh Vera on [address]  p\par             sees every 6 weeks\par             ENT: Dr. Mekhi Dhillon 9/20/18 R neck mass \par            .\par            CC: Diabetes ~ 2015 A1c 7.1-7.6 % (did not tolerate glipizide)\par             (born with decreased hearing) \par             MI: 2/4/2016 - presented with sweating and weakness -> SJR\par             -smokes 2 cigars/week \par             Elevated PTH (167 2/20/18, 25 OH vit D low) \par             , borderline elev calcium Hx kidney stone\par            .\par Brings his One Touch Verio.  He checks mostly fasting BS a few times a week, a few after noon BS (much higher).\par I gave him CGM:  Freestyle Lily 14 which he was able to use for 11 of 14 days.    He generally checked his sugar  once, occasionally twice a day even with the Lily, so there are gaps in the graphical information  On the 11th day of 14 the Lily got knocked off and he said he does not want to use it going into the future.   However, the Lily nicely documented marked excursion in his blood sugar after meals, a time missed on the fingerstick data.  \par \par August - Sept 4 he will be on a "boatel" in [place].   \par ROV here in October.   \par \par He reports that Dr. Malin would like him to try an SGLT-2 inhibitor, Jardiance. Requested and he has been instructed in its use, warned of potential side effects and importance of extra fluid intake.   \par \par \par \par \par \par \par Previous notes from [employer] appended below.\par \par November 12, 2018\par            .\par            PCP: Dr. Meng Ro: [facility] \par             [address], [place] [address]\par             [phone] Fax \par            .\par             Cardiology: Dr. Bo Mendez p [phone]\par             f [phone]\par             and now Dr. Regan Hamilton p  \par             f: [phone]\par             on [facility]. \par             Eyes: Dr. Amado Curran p \par             f  \par             Pod: Dr. Mitesh Vera on [address]  p\par             sees every 6 weeks\par             ENT: Dr. Mekhi Dhillon 9/20/18 R neck mass \par            .\par            CC: Diabetes ~ 2015 A1c 7.1-7.6 % (did not claude glipizide)\par             (born with decreased hearing) \par             MI: 2/4/2016 - presented with sweating and weakness -> SJR\par             -smokes 2 cigars/week \par             Elevated PTH (167 2/20/18, 25 OH vit D low) \par             , borderline elev calcium Hx kidney stone\par            .\par            47 yo with decreased hearing visits for diabetes, low vitamin D,\par            early hyperparathyroidism.\par            Just returned from a trip to [place].\par            Did a lot of fishing.\par            [facility]s the [employer].\par            Forget his blood glucose meter today. \par            .\par            lab tests from\par            6/29/2018 (last visit) included\par            25 OH vit D 19.9\par            ionized calcium 1.34 (1.12 - 1/30)\par            total calcium 10.0\par            glucose 142 mg/dl \par            HbA1c 6.2 % (had been 6.3 in February)\par            phos 3.5\par            .\par            Good eye report from January - Dr. Farr.\par            .\par            September saw Dr. Tariq Dhillon for R neck mass (infectious)\par            .\par            Medications include\par            Plavix 75 mg\par            glyburide-metformin 2l5 500 BID\par            lisinopril 5 mg daily\par            metoprolol 25 mg BID\par            simvastain 40 mg daily\par            .\par            FBS today 186 after pizza last night. \par            Rest of the Verio readings are mostly reasonable.\par            .\par            Plan: Updated labs today.\par            Discussed Freestyle Lily 14 sensors and reader and [facility].\par            Take OTC vitamin D 1000 iu daily.\par            Bring his wife to next visiti in March\par            ==\par

## 2021-02-09 ENCOUNTER — APPOINTMENT (OUTPATIENT)
Dept: ENDOCRINOLOGY | Facility: CLINIC | Age: 51
End: 2021-02-09

## 2022-02-12 ENCOUNTER — NON-APPOINTMENT (OUTPATIENT)
Age: 52
End: 2022-02-12

## 2022-02-21 ENCOUNTER — NON-APPOINTMENT (OUTPATIENT)
Age: 52
End: 2022-02-21

## 2022-06-10 ENCOUNTER — APPOINTMENT (OUTPATIENT)
Dept: ENDOCRINOLOGY | Facility: CLINIC | Age: 52
End: 2022-06-10
Payer: COMMERCIAL

## 2022-06-10 VITALS
SYSTOLIC BLOOD PRESSURE: 110 MMHG | HEIGHT: 71 IN | TEMPERATURE: 98 F | BODY MASS INDEX: 30.8 KG/M2 | HEART RATE: 65 BPM | DIASTOLIC BLOOD PRESSURE: 70 MMHG | WEIGHT: 220 LBS | OXYGEN SATURATION: 99 %

## 2022-06-10 DIAGNOSIS — D86.9 SARCOIDOSIS, UNSPECIFIED: ICD-10-CM

## 2022-06-10 PROCEDURE — 36415 COLL VENOUS BLD VENIPUNCTURE: CPT

## 2022-06-10 PROCEDURE — 99214 OFFICE O/P EST MOD 30 MIN: CPT | Mod: 25

## 2022-06-12 LAB
24R-OH-CALCIDIOL SERPL-MCNC: 94.3 PG/ML
25(OH)D3 SERPL-MCNC: 24.3 NG/ML
ANION GAP SERPL CALC-SCNC: 19 MMOL/L
BUN SERPL-MCNC: 20 MG/DL
CALCIUM SERPL-MCNC: 10.2 MG/DL
CALCIUM SERPL-MCNC: 10.2 MG/DL
CHLORIDE SERPL-SCNC: 103 MMOL/L
CHOLEST SERPL-MCNC: 161 MG/DL
CO2 SERPL-SCNC: 22 MMOL/L
CREAT SERPL-MCNC: 0.66 MG/DL
EGFR: 112 ML/MIN/1.73M2
ESTIMATED AVERAGE GLUCOSE: 128 MG/DL
GLUCOSE SERPL-MCNC: 50 MG/DL
HBA1C MFR BLD HPLC: 6.1 %
HDLC SERPL-MCNC: 38 MG/DL
LDLC SERPL CALC-MCNC: 87 MG/DL
NONHDLC SERPL-MCNC: 123 MG/DL
PARATHYROID HORMONE INTACT: 94 PG/ML
PHOSPHATE SERPL-MCNC: 3.1 MG/DL
POTASSIUM SERPL-SCNC: 3.6 MMOL/L
SODIUM SERPL-SCNC: 144 MMOL/L
T4 SERPL-MCNC: 6.4 UG/DL
TRIGL SERPL-MCNC: 180 MG/DL
TSH SERPL-ACNC: 1.23 UIU/ML

## 2022-06-12 NOTE — HISTORY OF PRESENT ILLNESS
[FreeTextEntry1] : Ayaz 10, 2022     in person accompanied by his wife - [phone]   ****Elev Ca***\par \par PCP: Dr. Meng Ro: [facility]\par             [address], [place] [address]   [phone] Fax \par            .\par             Cardiology: Dr. Bo Mendez p [phone]    f [phone] XXX\par             and now Dr. Regan Hamilton p   f: [phone]  on [facility]. XXX\par             now Dr. Jaspreet Crowder at [facility]\par             Eyes: Dr. Amado Curran p       f  \par             Pod: Dr. Mitesh Vera on [address]  p   sees every 6 weeks\par             ENT: Dr. Mekhi Dhillon 9/20/18 R neck mass - removed - apparently benign  \par              Neurol:  Dr. 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 Meng Ro: [facility] \par             [address], [place] [address]   [phone] Fax \par            .\par             Cardiology: Dr. Bo Mendez p [phone]    f [phone]\par             and now Dr. Regan Hamilton p   f: [phone]  on [facility]. \par             Eyes: Dr. Amado Curran p       f  \par             Pod: Dr. Mitesh Vera on [address]  p   sees every 6 weeks\par             ENT: Dr. 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  Highest sugar 165 \par History of low vitamin D - taking 400 iu BID per his recollection\par \par \par \par Most recent A1c:  Oct 2019  6.4,  Feb 2020 6.2\par Reports he is taking vitamin D    \par \par Examination: \par Constitutional:  Alert, well nourished, healthy appearance, no acute distress \par Eyes:  No proptosis, no lid lag\par Thyroid:\par Pulmonary:  No respiratory distress, no accessory muscle use; normal rate and effort\par Cardiac:  Normal rate\par Vascular: \par Endocrine:  No stigmata of Cushing’s Syndrome\par Musculoskeletal:  Normal gait, no involuntary movements\par Neurology:  No tremors\par Affect/Mood/Psych:  Oriented x 3; normal affect, normal insight/judgement, normal mood \par .\par  \par \par \par Impression:  Doing very well.\par Denies hypoglycemia.\par No recent kidney stones.\par \par Plan:  Same Rx and ROV 3-4 months\par Continue Jardiance 10 mg daaily \par glyburide-metformin 1. in PM\par \par \par May 06, 2020    Telephone Visit, including his wife - her cell  (VideoChat failed)\par \par PCP: Dr. Meng Ro: [facility] \par             [address], [place] [address]   [phone] Fax \par            .\par             Cardiology: Dr. Bo Mendez p [phone]    f [phone]\par             and now Dr. Regan Hamilton p   f: [phone]  on [facility]. \par             Eyes: Dr. Amado Curran p       f  \par             Pod: Dr. Mitesh Vera on [address]  p   sees every 6 weeks\par             ENT: Dr. Mekhi Dhillon 9/20/18 R neck mass - removed - apparently benign  \par            .\par            CC: Diabetes ~ 2015 A1c 7.1-7.6 % (did not tolerate glipizide)   11/12/2018 A1c 6.9%  10/19/19 A1c 6.4 %\par             (born with decreased hearing) \par             MI: 2/4/2016 - presented with sweating and weakness -> SJR\par             -smokes 2 cigars/week \par             Elevated PTH (167 2/20/18, 25 OH vit D low = 17.9 11/12/2018  ) \par             , borderline elev calcium Hx kidney stone\par \par 50 yo visits for diabetes and hypercalcemia.   Feb 18 calcium 11.0 (had been 10.1 in October)\par Previous history of low vit D and elevated PTH.  \par Likely has hyperparathyroidism.\par \par He reports BS in good range on\par Jardiance and \par glyburide-metformin and needs refills.\par Advised to return to office within 3 months.  \par His wife tested positive for Covid 19 x 2, now awaits abs.   No one else in 5 person household positive.\par \par He says BS in good range.\par Taking Jard[facility] from local [facility] and\par glyburide 1.25 - metformin from mail order.\par \par No hypoglycemia.  \par He will return in 3 months.  \par \par \par Feb 18, 2020\par \par PCP: Dr. Meng Ro: [facility] \par             [address], [place] [address]   [phone] Fax \par            .\par             Cardiology: Dr. Bo Mendez p [phone]    f [phone]\par             and now Dr. Regan Hamilton p   f: [phone]  on [facility]. \par             Eyes: Dr. Amado Curran p       f  \par             Pod: Dr. Mitesh Vera on [address]  p   sees every 6 weeks\par             ENT: Dr. Mekhi Dhillon 9/20/18 R neck mass - removed - apparently benign  \par            .\par            CC: Diabetes ~ 2015 A1c 7.1-7.6 % (did not tolerate glipizide)   11/12/2018 A1c 6.9%  10/19/19 A1c 6.4 %\par             (born with decreased hearing) \par             MI: 2/4/2016 - presented with sweating and weakness -> SJR\par             -smokes 2 cigars/week \par             Elevated PTH (167 2/20/18, 25 OH vit D low = 17.9 11/12/2018  ) \par             , borderline elev calcium Hx kidney stone\par \par 50 yo visits for diabetes.  \par \par He is taking daily OTC vitamin D\par Jardiance  10 mg daily  (lost 10 lbs after starting it).\par glyburide-metformin 2.5/500 once a day in AM  \par            .\par \par He brings in the OneTouch Verio  \par FBS run around 120\par \par Impression:  A1c trajectory has been one of improvement over time.\par Will check A1c again today.\par He might benefit from increasing the glyb-met to BID\par ROV in June.  \par \par Oct 14, 2019\par \par PCP: Dr. Meng Ro: [facility] \par             [address], [place] [address]   [phone] Fax \par            .\par             Cardiology: Dr. Bo Mendez p [phone]    f [phone]\par             and now Dr. Regan Hamilton p   f: [phone]  on [facility]. \par             Eyes: Dr. Amado Curran p       f  \par             Pod: Dr. Mitesh Vera on [address]  p   sees every 6 weeks\par             ENT: Dr. Mekhi Dhillon 9/20/18 R neck mass \par            .\par            CC: Diabetes ~ 2015 A1c 7.1-7.6 % (did not tolerate glipizide)   11/12/2018 A1c 6.9%\par             (born with decreased hearing) \par             MI: 2/4/2016 - presented with sweating and weakness -> SJR\par             -smokes 2 cigars/week \par             Elevated PTH (167 2/20/18, 25 OH vit D low = 17.9 11/12/2018  ) \par             , borderline elev calcium Hx kidney stone\par            .\par "I had too much to eat yesterday, bar-b-cue....\par My wife would like me to get the Freestyle Lily to monitor the dog...."\par He previously tried the Lily and returned it as he did not like it.\par Briings in the One Touch Verio \par FBS today 180 mg/dl\par yesterday \par day before FBS 80\par July 27 5 pm 188 mg/dl   \par \par Taking Jardiance.\par Not sure of other medications.\par Recent labs by Dr. Contreras.  \par \par \par May 21, 2019\par \par PCP: Dr. Meng Ro: [facility] \par             [address], [place] [address]\par             [phone] Fax \par            .\par             Cardiology: Dr. Bo Mendez p [phone]\par             f [phone]\par             and now Dr. Regan Hamilton p  \par             f: [phone]\par             on [facility]. \par             Eyes: Dr. Amado Curran p \par             f  \par             Pod: Dr. Mitesh Vera on [address]  p\par             sees every 6 weeks\par             ENT: Dr. Mekhi Dhillon 9/20/18 R neck mass \par            .\par            CC: Diabetes ~ 2015 A1c 7.1-7.6 % (did not tolerate glipizide)\par             (born with decreased hearing) \par             MI: 2/4/2016 - presented with sweating and weakness -> SJR\par             -smokes 2 cigars/week \par             Elevated PTH (167 2/20/18, 25 OH vit D low) \par             , borderline elev calcium Hx kidney stone\par            .\par Brings his One Touch Verio.  He checks mostly fasting BS a few times a week, a few after noon BS (much higher).\par I gave him CGM:  Freestyle Lily 14 which he was able to use for 11 of 14 days.    He generally checked his sugar  once, occasionally twice a day even with the Lily, so there are gaps in the graphical information  On the 11th day of 14 the Lily got knocked off and he said he does not want to use it going into the future.   However, the Lily nicely documented marked excursion in his blood sugar after meals, a time missed on the fingerstick data.  \par \par August - Sept 4 he will be on a "boatel" in [place].   \par ROV here in October.   \par \par He reports that Dr. Malin would like him to try an SGLT-2 inhibitor, Jardiance. Requested and he has been instructed in its use, warned of potential side effects and importance of extra fluid intake.   \par \par \par \par \par \par \par Previous notes from [employer] appended below.\par \par November 12, 2018\par            .\par            PCP: Dr. Meng Ro: [facility] \par             [address], [place] [address]\par             [phone] Fax \par            .\par             Cardiology: Dr. Bo Mendez p [phone]\par             f [phone]\par             and now Dr. Regan Hamilton p  \par             f: [phone]\par             on [facility]. \par             Eyes: Dr. Amado Curran p \par             f  \par             Pod: Dr. Mitesh Vera on [address]  p\par             sees every 6 weeks\par             ENT: Dr. Mekhi Dhillon 9/20/18 R neck mass \par            .\par            CC: Diabetes ~ 2015 A1c 7.1-7.6 % (did not claude glipizide)\par             (born with decreased hearing) \par             MI: 2/4/2016 - presented with sweating and weakness -> SJR\par             -smokes 2 cigars/week \par             Elevated PTH (167 2/20/18, 25 OH vit D low) \par             , borderline elev calcium Hx kidney stone\par            .\par            47 yo with decreased hearing visits for diabetes, low vitamin D,\par            early hyperparathyroidism.\par            Just returned from a trip to [place].\par            Did a lot of fishing.\par            Knows the [employer] [place].\par            Forget his blood glucose meter today. \par            .\par            lab tests from\par            6/29/2018 (last visit) included\par            25 OH vit D 19.9\par            ionized calcium 1.34 (1.12 - 1/30)\par            total calcium 10.0\par            glucose 142 mg/dl \par            HbA1c 6.2 % (had been 6.3 in February)\par            phos 3.5\par            .\par            Good eye report from January - Dr. Farr.\par            .\par            September saw Dr. Tariq Dhillon for R neck mass (infectious)\par            .\par            Medications include\par            Plavix 75 mg\par            glyburide-metformin 2l5 500 BID\par            lisinopril 5 mg daily\par            metoprolol 25 mg BID\par            simvastain 40 mg daily\par            .\par            FBS today 186 after pizza last night. \par            Rest of the Verio readings are mostly reasonable.\par            .\par            Plan: Updated labs today.\par            Discussed Freestyle Lily 14 sensors and reader and he will [facility].\par            Take OTC vitamin D 1000 iu daily.\par            Bring his wife to next visiti in March\par            ==\par

## 2022-10-09 LAB
ALBUMIN MFR SERPL ELPH: 65.4 %
ALBUMIN SERPL-MCNC: 4.3 G/DL
ALBUMIN/GLOB SERPL: 2 RATIO
ALPHA1 GLOB MFR SERPL ELPH: 4.2 %
ALPHA1 GLOB SERPL ELPH-MCNC: 0.3 G/DL
ALPHA2 GLOB MFR SERPL ELPH: 9.4 %
ALPHA2 GLOB SERPL ELPH-MCNC: 0.6 G/DL
B-GLOBULIN MFR SERPL ELPH: 11.3 %
B-GLOBULIN SERPL ELPH-MCNC: 0.7 G/DL
GAMMA GLOB FLD ELPH-MCNC: 0.6 G/DL
GAMMA GLOB MFR SERPL ELPH: 9.7 %
INTERPRETATION SERPL IEP-IMP: NORMAL
M PROTEIN SPEC IFE-MCNC: NORMAL
PROT SERPL-MCNC: 6.5 G/DL
PROT SERPL-MCNC: 6.5 G/DL

## 2022-10-10 ENCOUNTER — APPOINTMENT (OUTPATIENT)
Dept: ENDOCRINOLOGY | Facility: CLINIC | Age: 52
End: 2022-10-10

## 2022-10-10 VITALS
BODY MASS INDEX: 30.8 KG/M2 | DIASTOLIC BLOOD PRESSURE: 74 MMHG | HEIGHT: 71 IN | WEIGHT: 220 LBS | HEART RATE: 77 BPM | SYSTOLIC BLOOD PRESSURE: 122 MMHG | OXYGEN SATURATION: 98 %

## 2022-10-10 DIAGNOSIS — E55.9 VITAMIN D DEFICIENCY, UNSPECIFIED: ICD-10-CM

## 2022-10-10 PROCEDURE — 99214 OFFICE O/P EST MOD 30 MIN: CPT

## 2022-10-10 NOTE — HISTORY OF PRESENT ILLNESS
[FreeTextEntry1] : Oct 10, 2022  in person \par \par PCP:  Dr. Meng Ro: [facility]\par             [address], [place] [address]   [phone] Fax \par            .\par             Cardiology: Dr. Bo Mendez p [phone]    f [phone] XXX\par             and then Dr. Regan Hamilton p   f: [phone]  on [facility]. XXX\par             now cardioloist  Dr. Jaspreet Crowder at [facility]\par             Eyes: Dr. Amado Curran p       f  \par             Pod: Dr. Mitesh Vera on [address]  p   sees every 6 weeks - has ingrown toenail x 10+ yrs \par             ENT: Dr. Mekhi Dhillon 9/20/18 R neck mass - removed - apparently benign  \par              Neurol:  Dr. Coleman Ball at [facility] for PD     Dx after leg fx\par            .\par            CC: Diabetes ~ 2015 A1c 7.1-7.6 % (did not tolerate glipizide)   11/12/2018 A1c 6.9%  10/19/19 A1c 6.4 %\par                                               2/2020: Ac 6.2 %\par             (born with decreased hearing) \par             MI: 2/4/2016 - presented with sweating and weakness -> SJR\par             -smokes 2 cigars/week \par             Elevated PTH (167 2/20/18, 25 OH vit D low = 17.9 11/12/2018  )            6/2022 Ca 10.2, PTH 94 25 OH vit D 24.3\par             , borderline elev calcium Hx kidney stone\par \par Has today, Carlos day, off.  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 on [address] and Rx'd with abx x 1 week.   \par \par Jardiance 10 mg daily - per Dr. Malin\par glyburide-metformin 1. daily \par simvastatin  \par \par Most recent 6/10/22 A1c 6.1%    \par \par : :\par Constitutional:  Alert, well nourished, healthy appearance, no acute distress \par Eyes:  No proptosis, no stare\par Thyroid:\par Pulmonary:  No respiratory distress, no accessory muscle use; normal rate and effort\par Cardiac:  Normal rate\par Vascular: \par Endocrine:  No stigmata of Cushing’s Syndrome\par Musculoskeletal:  Normal gait, no involuntary movements\par Neurology:  No tremors\par Affect/Mood/Psych:  Oriented x 3; normal affect, normal insight/judgement, normal mood \par .\par \par Imp/Plan:  In view of excellent fingerstick BS and A1c, as well as recent UTI, will discontinue Jardiance.\par Updated labs today.\par See Opthalmology at least once a year.  \par \par Fx of tibia when fell off later; Hx kidney stone, Hx low vit D, elevated PTH\par Monitor calcium, PTH, 25 OH vit D level.    In the future, 24 hour urine study\par \par \par Ayaz 10, 2022     in person accompanied by his wife - [phone]   ****Elev Ca***\par \par PCP: Dr. Meng Ro: [facility]\par             [address], [place] [address]   [phone] Fax \par            .\par             Cardiology: Dr. Bo Mendez p [phone]    f [phone] XXX\par             and now Dr. Regan Hamilton p   f: [phone]  on [facility]. XXX\par             now Dr. Jaspreet Crowder at [facility]\par             Eyes: Dr. Amado Curran p       f  \par             Pod: Dr. Mitesh Vera on [address]  p   sees every 6 weeks\par             ENT: Dr. Mekhi Dhillon 9/20/18 R neck mass - removed - apparently benign  \par              Neurol:  Dr. 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 Meng Ro: [facility] \par             [address], [place] [address]   [phone] Fax \par            .\par             Cardiology: Dr. Bo Mendez p [phone]    f [phone]\par             and now Dr. Regan Hamilton p   f: [phone]  on [facility]. \par             Eyes: Dr. Amado Curran p       f  \par             Pod: Dr. Mitesh Vera on [address]  p   sees every 6 weeks\par             ENT: Dr. 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  Highest sugar 165 \par History of low vitamin D - taking 400 iu BID per his recollection\par \par \par \par Most recent A1c:  Oct 2019  6.4,  Feb 2020 6.2\par Reports he is taking vitamin D    \par \par Examination: \par Constitutional:  Alert, well nourished, healthy appearance, no acute distress \par Eyes:  No proptosis, no lid lag\par Thyroid:\par Pulmonary:  No respiratory distress, no accessory muscle use; normal rate and effort\par Cardiac:  Normal rate\par Vascular: \par Endocrine:  No stigmata of Cushing’s Syndrome\par Musculoskeletal:  Normal gait, no involuntary movements\par Neurology:  No tremors\par Affect/Mood/Psych:  Oriented x 3; normal affect, normal insight/judgement, normal mood \par .\par  \par \par \par Impression:  Doing very well.\par Denies hypoglycemia.\par No recent kidney stones.\par \par Plan:  Same Rx and ROV 3-4 months\par Continue Jardiance 10 mg daaily \par glyburide-metformin 1. in PM\par \par \par May 06, 2020    Telephone Visit, including his wife - her cell  (VideoChat failed)\par \par PCP: Dr. Meng Ro: [facility] \par             [address], [place] [address]   [phone] Fax \par            .\par             Cardiology: Dr. Bo Mendez p [phone]    f [phone]\par             and now Dr. Regan Hamilton p   f: [phone]  on [facility]. \par             Eyes: Dr. Amado Curran p       f  \par             Pod: Dr. Mitesh Vera on [address]  p   sees every 6 weeks\par             ENT: Dr. Mekhi Dhillon 9/20/18 R neck mass - removed - apparently benign  \par            .\par            CC: Diabetes ~ 2015 A1c 7.1-7.6 % (did not tolerate glipizide)   11/12/2018 A1c 6.9%  10/19/19 A1c 6.4 %\par             (born with decreased hearing) \par             MI: 2/4/2016 - presented with sweating and weakness -> SJR\par             -smokes 2 cigars/week \par             Elevated PTH (167 2/20/18, 25 OH vit D low = 17.9 11/12/2018  ) \par             , borderline elev calcium Hx kidney stone\par \par 48 yo visits for diabetes and hypercalcemia.   Feb 18 calcium 11.0 (had been 10.1 in October)\par Previous history of low vit D and elevated PTH.  \par Likely has hyperparathyroidism.\par \par He reports BS in good range on\par Jardiance and \par glyburide-metformin and needs refills.\par Advised to return to office within 3 months.  \par His wife tested positive for Covid 19 x 2, now awaits abs.   No one else in 5 person household positive.\par \par He says BS in good range.\par Taking [facility]ardiance from local [facility] and\par glyburide 1.25 - metformin from mail order.\par \par No hypoglycemia.  \par He will return in 3 months.  \par \par \par Feb 18, 2020\par \par PCP: Dr. Meng Ro: [facility] \par             [address], [place] [address]   [phone] Fax \par            .\par             Cardiology: Dr. Bo Mendez p [phone]    f [phone]\par             and now Dr. Regan Hamilton p   f: [phone]  on [facility]. \par             Eyes: Dr. Amado Curran p       f  \par             Pod: Dr. Mitesh Vera on [address]  p   sees every 6 weeks\par             ENT: Dr. Mekhi Dhillon 9/20/18 R neck mass - removed - apparently benign  \par            .\par            CC: Diabetes ~ 2015 A1c 7.1-7.6 % (did not tolerate glipizide)   11/12/2018 A1c 6.9%  10/19/19 A1c 6.4 %\par             (born with decreased hearing) \par             MI: 2/4/2016 - presented with sweating and weakness -> SJR\par             -smokes 2 cigars/week \par             Elevated PTH (167 2/20/18, 25 OH vit D low = 17.9 11/12/2018  ) \par             , borderline elev calcium Hx kidney stone\par \par 48 yo visits for diabetes.  \par \par He is taking daily OTC vitamin D\par Jardiance  10 mg daily  (lost 10 lbs after starting it).\par glyburide-metformin 2.5/500 once a day in AM  \par            .\par \par He brings in the OneTouch Verio  \par FBS run around 120\par \par Impression:  A1c trajectory has been one of improvement over time.\par Will check A1c again today.\par He might benefit from increasing the glyb-met to BID\par ROV in June.  \par \par Oct 14, 2019\par \par PCP: Dr. Meng Ro: [facility] \par             [address] [place] [address]   [phone] Fax \par            .\par             Cardiology: Dr. Bo Mendez p [phone]    f [phone]\par             and now Dr. Regan Hamilton p   f: [phone]  on [facility]. \par             Eyes: Dr. Amado Curran p       f  \par             Pod: Dr. Mitesh Vera on [address]  p   sees every 6 weeks\par             ENT: Dr. Mekhi Dhillon 9/20/18 R neck mass \par            .\par            CC: Diabetes ~ 2015 A1c 7.1-7.6 % (did not tolerate glipizide)   11/12/2018 A1c 6.9%\par             (born with decreased hearing) \par             MI: 2/4/2016 - presented with sweating and weakness -> SJR\par             -smokes 2 cigars/week \par             Elevated PTH (167 2/20/18, 25 OH vit D low = 17.9 11/12/2018  ) \par             , borderline elev calcium Hx kidney stone\par            .\par "I had too much to eat yesterday, bar-b-cue....\par My wife would like me to get the Freestyle Lily to monitor the dog...."\par He previously tried the Lily and returned it as he did not like it.\par Briings in the One Touch Verio \par FBS today 180 mg/dl\par yesterday \par day before FBS 80\par July 27 5 pm 188 mg/dl   \par \par Taking Jardiance.\par Not sure of other medications.\par Recent labs by Dr. Contreras.  \par \par \par May 21, 2019\par \par PCP: Dr. Meng Ro: [facility] \par             [address], [place] [address]\par             [phone] Fax \par            .\par             Cardiology: Dr. Bo Mendez p [phone]\par             f [phone]\par             and now Dr. Regan Hamilton p  \par             f: [phone]\par             on [facility]. \par             Eyes: Dr. Amado Curran p \par             f  \par             Pod: Dr. Mitesh Vera on [address]  p\par             sees every 6 weeks\par             ENT: Dr. Mekhi Dhillon 9/20/18 R neck mass \par            .\par            CC: Diabetes ~ 2015 A1c 7.1-7.6 % (did not tolerate glipizide)\par             (born with decreased hearing) \par             MI: 2/4/2016 - presented with sweating and weakness -> SJR\par             -smokes 2 cigars/week \par             Elevated PTH (167 2/20/18, 25 OH vit D low) \par             , borderline elev calcium Hx kidney stone\par            .\par Brings his One Touch Verio.  He checks mostly fasting BS a few times a week, a few after noon BS (much higher).\par I gave him CGM:  Freestyle Lily 14 which he was able to use for 11 of 14 days.    He generally checked his sugar  once, occasionally twice a day even with the Lily, so there are gaps in the graphical information  On the 11th day of 14 the Lily got knocked off and he said he does not want to use it going into the future.   However, the Lily nicely documented marked excursion in his blood sugar after meals, a time missed on the fingerstick data.  \par \par August - Sept 4 [place] [facility] [place] [place] [facility] [place] [facility]   \par ROV here in October.   \par \par He reports that Dr. Malin would like him to try an SGLT-2 inhibitor, Jardiance. Requested and he has been instructed in its use, warned of potential side effects and importance of extra fluid intake.   \par \par \par \par \par \par \par Previous notes from [employer] appended below.\par \par November 12, 2018\par            .\par            PCP: Dr. Meng Ro: [facility] \par             [address], [place] [address]\par             [phone] Fax \par            .\par             Cardiology: Dr. Bo Mendez p [phone]\par             f [phone]\par             and now Dr. Regan Hamilton p  \par             f: [phone]\par             on [facility]. \par             Eyes: Dr. Amado Curran p \par             f  \par             Pod: Dr. Mitesh Vera on [address]  p\par             sees every 6 weeks\par             ENT: Dr. Mekhi Dhillon 9/20/18 R neck mass \par            .\par            CC: Diabetes ~ 2015 A1c 7.1-7.6 % (did not claude glipizide)\par             (born with decreased hearing) \par             MI: 2/4/2016 - presented with sweating and weakness -> SJR\par             -smokes 2 cigars/week \par             Elevated PTH (167 2/20/18, 25 OH vit D low) \par             , borderline elev calcium Hx kidney stone\par            .\par            47 yo with decreased hearing visits for diabetes, low vitamin D,\par            early hyperparathyroidism.\par            Just returned from a trip to [place].\par            Did a lot of fishing.\par            Knows the [place].\par            Forget his blood glucose meter today. \par            .\par            lab tests from\par            6/29/2018 (last visit) included\par            25 OH vit D 19.9\par            ionized calcium 1.34 (1.12 - 1/30)\par            total calcium 10.0\par            glucose 142 mg/dl \par            HbA1c 6.2 % (had been 6.3 in February)\par            phos 3.5\par            .\par            Good eye report from January - Dr. Farr.\par            .\par            September saw Dr. Tariq Dhillon for R neck mass (infectious)\par            .\par            Medications include\par            Plavix 75 mg\par            glyburide-metformin 2l5 500 BID\par            lisinopril 5 mg daily\par            metoprolol 25 mg BID\par            simvastain 40 mg daily\par            .\par            FBS today 186 after pizza last night. \par            Rest of the Verio readings are mostly reasonable.\par            .\par            Plan: Updated labs today.\par            Discussed Freestyle Lily 14 sensors and reader and he will [facility].\par            Take OTC vitamin D 1000 iu daily.\par            Bring his wife to next visiti in March\par            ==\par

## 2022-10-12 ENCOUNTER — APPOINTMENT (OUTPATIENT)
Dept: FAMILY MEDICINE | Facility: CLINIC | Age: 52
End: 2022-10-12

## 2022-10-12 VITALS
OXYGEN SATURATION: 98 % | RESPIRATION RATE: 18 BRPM | BODY MASS INDEX: 31.92 KG/M2 | DIASTOLIC BLOOD PRESSURE: 72 MMHG | WEIGHT: 228 LBS | SYSTOLIC BLOOD PRESSURE: 108 MMHG | HEIGHT: 71 IN | HEART RATE: 62 BPM | TEMPERATURE: 98.3 F

## 2022-10-12 DIAGNOSIS — E78.5 HYPERLIPIDEMIA, UNSPECIFIED: ICD-10-CM

## 2022-10-12 DIAGNOSIS — G20 PARKINSON'S DISEASE: ICD-10-CM

## 2022-10-12 DIAGNOSIS — Z23 ENCOUNTER FOR IMMUNIZATION: ICD-10-CM

## 2022-10-12 DIAGNOSIS — I25.10 ATHEROSCLEROTIC HEART DISEASE OF NATIVE CORONARY ARTERY W/OUT ANGINA PECTORIS: ICD-10-CM

## 2022-10-12 DIAGNOSIS — F32.A ANXIETY DISORDER, UNSPECIFIED: ICD-10-CM

## 2022-10-12 DIAGNOSIS — Z00.00 ENCOUNTER FOR GENERAL ADULT MEDICAL EXAMINATION W/OUT ABNORMAL FINDINGS: ICD-10-CM

## 2022-10-12 DIAGNOSIS — F41.9 ANXIETY DISORDER, UNSPECIFIED: ICD-10-CM

## 2022-10-12 DIAGNOSIS — G62.9 POLYNEUROPATHY, UNSPECIFIED: ICD-10-CM

## 2022-10-12 PROCEDURE — 90472 IMMUNIZATION ADMIN EACH ADD: CPT

## 2022-10-12 PROCEDURE — 90686 IIV4 VACC NO PRSV 0.5 ML IM: CPT

## 2022-10-12 PROCEDURE — 99386 PREV VISIT NEW AGE 40-64: CPT | Mod: 25

## 2022-10-12 PROCEDURE — 36415 COLL VENOUS BLD VENIPUNCTURE: CPT

## 2022-10-12 PROCEDURE — G0444 DEPRESSION SCREEN ANNUAL: CPT | Mod: 59

## 2022-10-12 PROCEDURE — G0009: CPT

## 2022-10-12 PROCEDURE — 90677 PCV20 VACCINE IM: CPT

## 2022-10-12 RX ORDER — TICAGRELOR 90 MG/1
90 TABLET ORAL
Qty: 180 | Refills: 0 | Status: ACTIVE | COMMUNITY
Start: 2022-06-23

## 2022-10-12 RX ORDER — METOPROLOL TARTRATE 25 MG/1
25 TABLET, FILM COATED ORAL
Refills: 0 | Status: ACTIVE | COMMUNITY

## 2022-10-12 RX ORDER — ASPIRIN 81 MG/1
81 TABLET, COATED ORAL
Qty: 90 | Refills: 0 | Status: ACTIVE | COMMUNITY
Start: 2022-09-01

## 2022-10-12 RX ORDER — ATORVASTATIN CALCIUM 80 MG/1
80 TABLET, FILM COATED ORAL
Refills: 0 | Status: ACTIVE | COMMUNITY

## 2022-10-12 RX ORDER — SACUBITRIL AND VALSARTAN 24; 26 MG/1; MG/1
24-26 TABLET, FILM COATED ORAL
Qty: 180 | Refills: 0 | Status: ACTIVE | COMMUNITY
Start: 2022-08-29

## 2022-10-12 RX ORDER — ESCITALOPRAM OXALATE 10 MG/1
10 TABLET ORAL
Refills: 0 | Status: ACTIVE | COMMUNITY

## 2022-10-12 RX ORDER — CARBIDOPA, LEVODOPA AND ENTACAPONE 25; 100; 200 MG/1; MG/1; MG/1
25-100-200 TABLET, FILM COATED ORAL
Refills: 0 | Status: ACTIVE | COMMUNITY

## 2022-10-13 ENCOUNTER — NON-APPOINTMENT (OUTPATIENT)
Age: 52
End: 2022-10-13

## 2022-10-13 PROBLEM — Z23 NEED FOR PNEUMOCOCCAL VACCINE: Status: ACTIVE | Noted: 2022-10-12

## 2022-10-13 PROBLEM — Z23 NEEDS FLU SHOT: Status: ACTIVE | Noted: 2022-10-12

## 2022-10-13 LAB
BASOPHILS # BLD AUTO: 0.04 K/UL
BASOPHILS NFR BLD AUTO: 0.6 %
CHOLEST SERPL-MCNC: 146 MG/DL
EOSINOPHIL # BLD AUTO: 0.07 K/UL
EOSINOPHIL NFR BLD AUTO: 1 %
HCT VFR BLD CALC: 48.9 %
HDLC SERPL-MCNC: 41 MG/DL
HGB BLD-MCNC: 16.3 G/DL
IMM GRANULOCYTES NFR BLD AUTO: 0.3 %
LDLC SERPL CALC-MCNC: 79 MG/DL
LYMPHOCYTES # BLD AUTO: 2.04 K/UL
LYMPHOCYTES NFR BLD AUTO: 29 %
MAN DIFF?: NORMAL
MCHC RBC-ENTMCNC: 31.5 PG
MCHC RBC-ENTMCNC: 33.3 GM/DL
MCV RBC AUTO: 94.4 FL
MONOCYTES # BLD AUTO: 0.69 K/UL
MONOCYTES NFR BLD AUTO: 9.8 %
NEUTROPHILS # BLD AUTO: 4.17 K/UL
NEUTROPHILS NFR BLD AUTO: 59.3 %
NONHDLC SERPL-MCNC: 105 MG/DL
PLATELET # BLD AUTO: 203 K/UL
RBC # BLD: 5.18 M/UL
RBC # FLD: 13.5 %
TRIGL SERPL-MCNC: 127 MG/DL
WBC # FLD AUTO: 7.03 K/UL

## 2022-10-18 LAB
24R-OH-CALCIDIOL SERPL-MCNC: 77.2 PG/ML
ANION GAP SERPL CALC-SCNC: 10 MMOL/L
BUN SERPL-MCNC: 14 MG/DL
CA-I SERPL-SCNC: 5.3 MG/DL
CALCIUM SERPL-MCNC: 10.2 MG/DL
CALCIUM SERPL-MCNC: 10.2 MG/DL
CHLORIDE SERPL-SCNC: 105 MMOL/L
CO2 SERPL-SCNC: 26 MMOL/L
CREAT SERPL-MCNC: 0.8 MG/DL
EGFR: 105 ML/MIN/1.73M2
ESTIMATED AVERAGE GLUCOSE: 131 MG/DL
FRUCTOSAMINE SERPL-MCNC: 244 UMOL/L
GLUCOSE SERPL-MCNC: 138 MG/DL
HBA1C MFR BLD HPLC: 6.2 %
INSULIN SERPL-MCNC: 12.1 UU/ML
PARATHYROID HORMONE INTACT: 65 PG/ML
PHOSPHATE SERPL-MCNC: 3 MG/DL
POTASSIUM SERPL-SCNC: 4.6 MMOL/L
SODIUM SERPL-SCNC: 141 MMOL/L

## 2023-01-17 ENCOUNTER — APPOINTMENT (OUTPATIENT)
Dept: SURGERY | Facility: CLINIC | Age: 53
End: 2023-01-17
Payer: COMMERCIAL

## 2023-01-17 ENCOUNTER — NON-APPOINTMENT (OUTPATIENT)
Age: 53
End: 2023-01-17

## 2023-01-17 PROCEDURE — 99203 OFFICE O/P NEW LOW 30 MIN: CPT

## 2023-01-17 RX ORDER — LANCETS
EACH MISCELLANEOUS
Qty: 90 | Refills: 3 | Status: COMPLETED | COMMUNITY
Start: 2020-03-05 | End: 2023-01-17

## 2023-01-17 RX ORDER — EMPAGLIFLOZIN 10 MG/1
10 TABLET, FILM COATED ORAL
Qty: 90 | Refills: 3 | Status: COMPLETED | COMMUNITY
Start: 2019-05-21 | End: 2023-01-17

## 2023-01-17 RX ORDER — LANCETS 30 GAUGE
EACH MISCELLANEOUS
Qty: 300 | Refills: 3 | Status: COMPLETED | COMMUNITY
End: 2023-01-17

## 2023-01-17 RX ORDER — BLOOD SUGAR DIAGNOSTIC
STRIP MISCELLANEOUS TWICE DAILY
Qty: 2 | Refills: 2 | Status: COMPLETED | COMMUNITY
End: 2023-01-17

## 2023-01-17 RX ORDER — BLOOD-GLUCOSE METER
W/DEVICE EACH MISCELLANEOUS
Qty: 1 | Refills: 0 | Status: COMPLETED | COMMUNITY
Start: 2022-10-22 | End: 2023-01-17

## 2023-01-17 RX ORDER — BLOOD-GLUCOSE METER
W/DEVICE EACH MISCELLANEOUS
Qty: 1 | Refills: 0 | Status: COMPLETED | COMMUNITY
Start: 2020-03-05 | End: 2023-01-17

## 2023-01-17 NOTE — ASSESSMENT
[FreeTextEntry1] : Primary medical doctor: Dr. Maggie Pena\par \par Date: 1/17/2023\par \par Reason for referral chronically incarcerated right inguinal hernia\par \par This is a 52-year-old gentleman who is referred for evaluation of a chronically incarcerated right inguinal hernia.  Patient states this all started when he started having episodes of hematuria for which the primary care physician then ordered a CAT scan for.  The CT demonstrated a large chronically incarcerated inguinal right inguinal hernia containing fat and they were referred for further evaluation regarding the CT finding.\par \par Patient currently states he has chronic pain in the right groin this is exacerbated when he coughs sneezes or does any type of lifting type activities.  Patient states there is pain in his right testicle as well at times.  He has had no change in bowel habits no nausea no vomiting.\par \par 's examination the abdomen is obese soft nontender nontender nondistended.  He has an obvious right inguinal hernia with extension into the right mid scrotum.  The hernia is not fully reducible.  The right testicle is not well evaluated due to its chronically incarcerated nature.  The left side is not well evaluated due to the gentlemen's poor efforts with Valsalva maneuvers.  He could easily have a small hernia that is missed on examination due to the amount of fat he has in the left groin left scrotum and testicles within normal limits testicle slightly atrophic\par \par Impression/plan chronically incarcerated right inguinal hernia, left groin laxity, right groin pain: This is a 52-year-old gentleman whose initial presentation was hematuria and during the work-up is noted to have a chronically incarcerated right inguinal hernia.  Patient states he has chronic right groin pain now and he has a large lump in the right groin.  Physical examination confirms that he has a chronically incarcerated right inguinal hernia.  Left side was not well evaluated on physical examination.\par \par Patient will be scheduled for a laparoscopic repair of the right inguinal hernia, at the time of laparoscopy I will examine the left side and if her hernia is identified will be repaired at the same setting.  If the hernia cannot be done laparoscopically I will convert this operation to an open right inguinal hernia repair.  Due to the size of the hernia on the right he is more likely develop a postoperative seroma which may or may not resolve this was conveyed to the patient and his wife at length.  They understand and want to proceed.  I have also discussed the various surgical approaches with and without mesh and a laparoscopic with mesh approach is best at this setting.\par \par The indications alternatives and complication of procedure discussed questions answered written consent obtained in the office today.

## 2023-02-10 ENCOUNTER — APPOINTMENT (OUTPATIENT)
Dept: FAMILY MEDICINE | Facility: CLINIC | Age: 53
End: 2023-02-10

## 2023-02-10 ENCOUNTER — NON-APPOINTMENT (OUTPATIENT)
Age: 53
End: 2023-02-10

## 2023-02-10 ENCOUNTER — APPOINTMENT (OUTPATIENT)
Dept: FAMILY MEDICINE | Facility: CLINIC | Age: 53
End: 2023-02-10
Payer: COMMERCIAL

## 2023-02-10 VITALS
SYSTOLIC BLOOD PRESSURE: 128 MMHG | HEART RATE: 62 BPM | OXYGEN SATURATION: 98 % | DIASTOLIC BLOOD PRESSURE: 70 MMHG | WEIGHT: 237 LBS | HEIGHT: 71 IN | BODY MASS INDEX: 33.18 KG/M2 | RESPIRATION RATE: 18 BRPM | TEMPERATURE: 97.5 F

## 2023-02-10 PROCEDURE — 36415 COLL VENOUS BLD VENIPUNCTURE: CPT

## 2023-02-10 PROCEDURE — 99213 OFFICE O/P EST LOW 20 MIN: CPT | Mod: 25

## 2023-02-13 LAB
ANION GAP SERPL CALC-SCNC: 13 MMOL/L
BASOPHILS # BLD AUTO: 0.03 K/UL
BASOPHILS NFR BLD AUTO: 0.5 %
BUN SERPL-MCNC: 16 MG/DL
CALCIUM SERPL-MCNC: 10.9 MG/DL
CHLORIDE SERPL-SCNC: 102 MMOL/L
CO2 SERPL-SCNC: 28 MMOL/L
CREAT SERPL-MCNC: 0.97 MG/DL
EGFR: 93 ML/MIN/1.73M2
EOSINOPHIL # BLD AUTO: 0.11 K/UL
EOSINOPHIL NFR BLD AUTO: 1.7 %
GLUCOSE SERPL-MCNC: 164 MG/DL
HCT VFR BLD CALC: 47.1 %
HGB BLD-MCNC: 15.4 G/DL
IMM GRANULOCYTES NFR BLD AUTO: 0.3 %
LYMPHOCYTES # BLD AUTO: 1.77 K/UL
LYMPHOCYTES NFR BLD AUTO: 26.7 %
MAN DIFF?: NORMAL
MCHC RBC-ENTMCNC: 31.9 PG
MCHC RBC-ENTMCNC: 32.7 GM/DL
MCV RBC AUTO: 97.5 FL
MONOCYTES # BLD AUTO: 0.62 K/UL
MONOCYTES NFR BLD AUTO: 9.4 %
NEUTROPHILS # BLD AUTO: 4.08 K/UL
NEUTROPHILS NFR BLD AUTO: 61.4 %
PLATELET # BLD AUTO: 197 K/UL
POTASSIUM SERPL-SCNC: 4.3 MMOL/L
RBC # BLD: 4.83 M/UL
RBC # FLD: 13.8 %
SODIUM SERPL-SCNC: 143 MMOL/L
WBC # FLD AUTO: 6.63 K/UL

## 2023-02-16 ENCOUNTER — NON-APPOINTMENT (OUTPATIENT)
Age: 53
End: 2023-02-16

## 2023-02-24 ENCOUNTER — RESULT REVIEW (OUTPATIENT)
Age: 53
End: 2023-02-24

## 2023-02-27 ENCOUNTER — RESULT REVIEW (OUTPATIENT)
Age: 53
End: 2023-02-27

## 2023-02-27 ENCOUNTER — TRANSCRIPTION ENCOUNTER (OUTPATIENT)
Age: 53
End: 2023-02-27

## 2023-02-27 ENCOUNTER — APPOINTMENT (OUTPATIENT)
Dept: SURGERY | Facility: HOSPITAL | Age: 53
End: 2023-02-27

## 2023-03-13 ENCOUNTER — APPOINTMENT (OUTPATIENT)
Dept: ENDOCRINOLOGY | Facility: CLINIC | Age: 53
End: 2023-03-13
Payer: COMMERCIAL

## 2023-03-13 VITALS
OXYGEN SATURATION: 95 % | DIASTOLIC BLOOD PRESSURE: 82 MMHG | WEIGHT: 235 LBS | SYSTOLIC BLOOD PRESSURE: 122 MMHG | HEIGHT: 71 IN | BODY MASS INDEX: 32.9 KG/M2 | HEART RATE: 70 BPM

## 2023-03-13 DIAGNOSIS — N20.0 CALCULUS OF KIDNEY: ICD-10-CM

## 2023-03-13 DIAGNOSIS — E83.52 HYPERCALCEMIA: ICD-10-CM

## 2023-03-13 DIAGNOSIS — E21.3 HYPERPARATHYROIDISM, UNSPECIFIED: ICD-10-CM

## 2023-03-13 PROCEDURE — 36415 COLL VENOUS BLD VENIPUNCTURE: CPT

## 2023-03-13 PROCEDURE — 99214 OFFICE O/P EST MOD 30 MIN: CPT | Mod: 25

## 2023-03-13 NOTE — HISTORY OF PRESENT ILLNESS
[FreeTextEntry1] : Mar 13, 2023    in person      (has iPhone)  \par \par PCP:  Dr. Meng Ro: [facility]\par             [address], [place] [address]   [phone] Fax \par            .\par             Cardiology: Dr. Bo Mendez p [phone]    f [phone] XXX\par             and then Dr. Regan Hamilton p   f: [phone]  on [facility]. XXX\par             now cardioloist  Dr. Jaspreet Crowder at [facility]\par             Eyes: Dr. Amado Curran p       f  \par             Pod: Dr. Mitesh Vera on [address]  p   sees every 6 weeks - has ingrown toenail x 10+ yrs \par             ENT: Dr. Mekhi Dhillon 9/20/18 R neck mass - removed - apparently benign  \par              Neurol:  Dr. Coleman Ball at [facility] for PD     Dx after leg fx\par             Surg:  Dr. Chava Madison - hernia repair 2/2022\par            .\par            CC: Diabetes ~ 2015 A1c 7.1-7.6 % (did not tolerate glipizide)   11/12/2018 A1c 6.9%  10/19/19 A1c 6.4 %\par                                               2/2020: Ac 6.2 %\par             (born with decreased hearing) \par             MI: 2/4/2016 - presented with sweating and weakness -> SJR\par             -smokes 2 cigars/week \par             Elevated PTH (167 2/20/18, 25 OH vit D low = 17.9 11/12/2018  )            6/2022 Ca 10.2, PTH 94 25 OH vit D 24.3\par             , borderline elev calcium Hx kidney stone\par \par 53 yo with diabetes since at least 2015.  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    History of Low vitamin D, borderline elevation of calcium, history of kidney stone, decreased hearing, smoker, followed by cardiology.   \par Recent hernia repair by Dr. Chava Madison.\par \par Today he brings his AccuChek Guide meter \par Sugars frequently go into the mid 200's and even over 300 mg/dl   \par \par Medications include:  \par \par Jardiance 10 mg daily - per Dr. Malin - but stopped  b/o nocturia.  \par glyburide-metformin 1. daily \par simvastatin  \par \par Most recent labs from 2/24/2010 include \par calcium 10.9 **\par \par : :\par Constitutional:  Alert, well nourished, healthy appearance, no acute distress \par Eyes:  No proptosis, no stare\par Thyroid:\par Pulmonary:  No respiratory distress, no accessory muscle use; normal rate and effort\par Cardiac:  Normal rate\par Vascular: \par Endocrine:  No stigmata of Cushing’s Syndrome\par Musculoskeletal:  Normal gait, no involuntary movements\par Neurology:  No tremors\par Affect/Mood/Psych:  Oriented x 3; normal affect, normal insight/judgement, normal mood \par .\par \par Impression:  Hyperparathyorid - will check US thyroid, US kidney, bone density\par Labs today\par Will need CGM in future.\par Will  need parathyroid surgery in future.\par \par \par \par Oct 10, 2022  in person \par \par PCP:  Dr. Meng Ro: [facility]\par             [address] [address]   [phone] Fax \par            .\par             Cardiology: Dr. Bo Mendez p [phone]    f [phone] XXX\par             and then Dr. Regan Hamilton p   f: [phone]  on [facility]. XXX\par             now cardioloist  Dr. Jaspreet Crowder at [facility]\par             Eyes: Dr. Amado Curran p       f  \par             Pod: Dr. Mitesh Vera on [address]  p   sees every 6 weeks - has ingrown toenail x 10+ yrs \par             ENT: Dr. Mekhi Dhillon 9/20/18 R neck mass - removed - apparently benign  \par              Neurol:  Dr. Coleman Ball at [facility] for PD     Dx after leg fx\par            .\par            CC: Diabetes ~ 2015 A1c 7.1-7.6 % (did not tolerate glipizide)   11/12/2018 A1c 6.9%  10/19/19 A1c 6.4 %\par                                               2/2020: Ac 6.2 %\par             (born with decreased hearing) \par             MI: 2/4/2016 - presented with sweating and weakness -> SJR\par             -smokes 2 cigars/week \par             Elevated PTH (167 2/20/18, 25 OH vit D low = 17.9 11/12/2018  )            6/2022 Ca 10.2, PTH 94 25 OH vit D 24.3\par             , borderline elev calcium Hx kidney stone\par \par Has today, [place] day, off.  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   \par \par Jardiance 10 mg daily - per Dr. Malin\par glyburide-metformin 1. daily \par simvastatin  \par \par Most recent 6/10/22 A1c 6.1%    \par \par : :\par Constitutional:  Alert, well nourished, healthy appearance, no acute distress \par Eyes:  No proptosis, no stare\par Thyroid:\par Pulmonary:  No respiratory distress, no accessory muscle use; normal rate and effort\par Cardiac:  Normal rate\par Vascular: \par Endocrine:  No stigmata of Cushing’s Syndrome\par Musculoskeletal:  Normal gait, no involuntary movements\par Neurology:  No tremors\par Affect/Mood/Psych:  Oriented x 3; normal affect, normal insight/judgement, normal mood \par .\par \par Imp/Plan:  In view of excellent fingerstick BS and A1c, as well as recent UTI, will discontinue Jardiance.\par Updated labs today.\par See Opthalmology at least once a year.  \par \par Fx of tibia when fell off later; Hx kidney stone, Hx low vit D, elevated PTH\par Monitor calcium, PTH, 25 OH vit D level.    In the future, 24 hour urine study\par \par \par Ayaz 10, 2022     in person accompanied by his wife - [phone]   ****Elev Ca***\par \par PCP: Dr. Meng Ro: [facility]\par             [address], [place] [address]   [phone] Fax \par            .\par             Cardiology: Dr. Bo Mendez p [phone]    f [phone] XXX\par             and now Dr. Regan Hamilton p   f: [phone]  on [facility]. XXX\par             now Dr. Jaspreet Crowder at [facility]\par             Eyes: Dr. Amado Curran p       f  \par             Pod: Dr. Mitesh Vera on [address]  p   sees every 6 weeks\par             ENT: Dr. Mekhi Dhillon 9/20/18 R neck mass - removed - apparently benign  \par              Neurol:  Dr. 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 Meng Ro: [facility] \par             [address], [place] [address]   [phone] Fax \par            .\par             Cardiology: Dr. Bo Mendez p [phone]    f [phone]\par             and now Dr. Regan Hamilton p   f: [phone]  on [facility]. \par             Eyes: Dr. Amado Curran p       f  \par             Pod: Dr. Mitesh Vera on [address]  p   sees every 6 weeks\par             ENT: Dr. 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  Highest sugar 165 \par History of low vitamin D - taking 400 iu BID per his recollection\par \par \par \par Most recent A1c:  Oct 2019  6.4,  Feb 2020 6.2\par Reports he is taking vitamin D    \par \par Examination: \par Constitutional:  Alert, well nourished, healthy appearance, no acute distress \par Eyes:  No proptosis, no lid lag\par Thyroid:\par Pulmonary:  No respiratory distress, no accessory muscle use; normal rate and effort\par Cardiac:  Normal rate\par Vascular: \par Endocrine:  No stigmata of Cushing’s Syndrome\par Musculoskeletal:  Normal gait, no involuntary movements\par Neurology:  No tremors\par Affect/Mood/Psych:  Oriented x 3; normal affect, normal insight/judgement, normal mood \par .\par  \par \par \par Impression:  Doing very well.\par Denies hypoglycemia.\par No recent kidney stones.\par \par Plan:  Same Rx and ROV 3-4 months\par Continue Jardiance 10 mg daaily \par glyburide-metformin 1. in PM\par \par \par May 06, 2020    Telephone Visit, including his wife - her cell  (VideoChat failed)\par \par PCP: Dr. Meng Ro: [facility] \par             [address], [place] [address]   [phone] Fax \par            .\par             Cardiology: Dr. Bo Mendez p [phone]    f [phone]\par             and now Dr. Regan Hamilton p   f: [phone]  on [facility]. \par             Eyes: Dr. Amado Curran p       f  \par             Pod: Dr. Mitesh Vera on [address]  p   sees every 6 weeks\par             ENT: Dr. Mekhi Dhillon 9/20/18 R neck mass - removed - apparently benign  \par            .\par            CC: Diabetes ~ 2015 A1c 7.1-7.6 % (did not tolerate glipizide)   11/12/2018 A1c 6.9%  10/19/19 A1c 6.4 %\par             (born with decreased hearing) \par             MI: 2/4/2016 - presented with sweating and weakness -> SJR\par             -smokes 2 cigars/week \par             Elevated PTH (167 2/20/18, 25 OH vit D low = 17.9 11/12/2018  ) \par             , borderline elev calcium Hx kidney stone\par \par 48 yo visits for diabetes and hypercalcemia.   Feb 18 calcium 11.0 (had been 10.1 in October)\par Previous history of low vit D and elevated PTH.  \par Likely has hyperparathyroidism.\par \par He reports BS in good range on\par Jardiance and \par glyburide-metformin and needs refills.\par Advised to return to office within 3 months.  \par His wife tested positive for Covid 19 x 2, now awaits abs.   No one else in 5 person household positive.\par \par He says BS in good range.\par Taking [facility] and\par glyburide 1.25 - metformin from mail order.\par \par No hypoglycemia.  \par He will return in 3 months.  \par \par \par Feb 18, 2020\par \par PCP: Dr. Meng Ro: [facility] \par             [address], [place] [address]   [phone] Fax \par            .\par             Cardiology: Dr. Bo Mendez p [phone]    f [phone]\par             and now Dr. Regan Hamilton p   f: [phone]  on [facility]. \par             Eyes: Dr. Amado Curran p       f  \par             Pod: Dr. Mitesh Vera on [address]  p   sees every 6 weeks\par             ENT: Dr. Mekhi Dhillon 9/20/18 R neck mass - removed - apparently benign  \par            .\par            CC: Diabetes ~ 2015 A1c 7.1-7.6 % (did not tolerate glipizide)   11/12/2018 A1c 6.9%  10/19/19 A1c 6.4 %\par             (born with decreased hearing) \par             MI: 2/4/2016 - presented with sweating and weakness -> SJR\par             -smokes 2 cigars/week \par             Elevated PTH (167 2/20/18, 25 OH vit D low = 17.9 11/12/2018  ) \par             , borderline elev calcium Hx kidney stone\par \par 48 yo visits for diabetes.  \par \par He is taking daily OTC vitamin D\par Jardiance  10 mg daily  (lost 10 lbs after starting it).\par glyburide-metformin 2.5/500 once a day in AM  \par            .\par \par He brings in the OneTouch Verio  \par FBS run around 120\par \par Impression:  A1c trajectory has been one of improvement over time.\par Will check A1c again today.\par He might benefit from increasing the glyb-met to BID\par ROV in June.  \par \par Oct 14, 2019\par \par PCP: Dr. Meng Ro: [facility] \par             [address], [place] [address]   [phone] Fax \par            .\par             Cardiology: Dr. Bo Mendez p [phone]    f [phone]\par             and now Dr. Regan Hamilton p   f: [phone]  on [facility]. \par             Eyes: Dr. Amado Curran p       f  \par             Pod: Dr. Mitesh Vera on [address]  p   sees every 6 weeks\par             ENT: Dr. Mekhi Dhillon 9/20/18 R neck mass \par            .\par            CC: Diabetes ~ 2015 A1c 7.1-7.6 % (did not tolerate glipizide)   11/12/2018 A1c 6.9%\par             (born with decreased hearing) \par             MI: 2/4/2016 - presented with sweating and weakness -> SJR\par             -smokes 2 cigars/week \par             Elevated PTH (167 2/20/18, 25 OH vit D low = 17.9 11/12/2018  ) \par             , borderline elev calcium Hx kidney stone\par            .\par "I had too much to eat yesterday, bar-b-cue....\par My wife would like me to get the Freestyle Lily to monitor the dog...."\par He previously tried the Lily and returned it as he did not like it.\par Briings in the One Touch Verio \par FBS today 180 mg/dl\par yesterday \par day before FBS 80\par July 27 5 pm 188 mg/dl   \par \par Taking Jardiance.\par Not sure of other medications.\par Recent labs by Dr. Contreras.  \par \par \par May 21, 2019\par \par PCP: Dr. Meng Ro: [facility] \par             [address], [place] [address]\par             [phone] Fax \par            .\par             Cardiology: Dr. Bo Mendez p [phone]\par             f [phone]\par             and now Dr. Regan Hamilton p  \par             f: [phone]\par             on [facility]. \par             Eyes: Dr. Amado Curran p \par             f  \par             Pod: Dr. Mitesh Vera on [address]  p\par             sees every 6 weeks\par             ENT: Dr. Mekhi Dhillon 9/20/18 R neck mass \par            .\par            CC: Diabetes ~ 2015 A1c 7.1-7.6 % (did not tolerate glipizide)\par             (born with decreased hearing) \par             MI: 2/4/2016 - presented with sweating and weakness -> SJR\par             -smokes 2 cigars/week \par             Elevated PTH (167 2/20/18, 25 OH vit D low) \par             , borderline elev calcium Hx kidney stone\par            .\par Brings his One Touch Verio.  He checks mostly fasting BS a few times a week, a few after noon BS (much higher).\par I gave him CGM:  Freestyle Lily 14 which he was able to use for 11 of 14 days.    He generally checked his sugar  once, occasionally twice a day even with the Lily, so there are gaps in the graphical information  On the 11th day of 14 the Lily got knocked off and he said he does not want to use it going into the future.   However, the Lily nicely documented marked excursion in his blood sugar after meals, a time missed on the fingerstick data.  \par \par August - Sept 4 he will be on a "boatel" in [place].   \par ROV here in October.   \par \par He reports that Dr. Malin would like him to try an SGLT-2 inhibitor, Jardiance. Requested and he has been instructed in its use, warned of potential side effects and importance of extra fluid intake.   \par \par \par \par \par \par \par Previous notes from [employer] appended below.\par \par November 12, 2018\par            .\par            PCP: Dr. Meng Ro: [facility] \par             [address], [place] [address]\par             [phone] Fax \par            .\par             Cardiology: Dr. Bo Mendez p [phone]\par             f [phone]\par             and now Dr. Regan Hamilton p  \par             f: [phone]\par             on [facility]. \par             Eyes: Dr. Amado Curran p \par             f  \par             Pod: Dr. Mitehs Vera on [address]  p\par             sees every 6 weeks\par             ENT: Dr. Mekhi Dhillon 9/20/18 R neck mass \par            .\par            CC: Diabetes ~ 2015 A1c 7.1-7.6 % (did not claude glipizide)\par             (born with decreased hearing) \par             MI: 2/4/2016 - presented with sweating and weakness -> SJR\par             -smokes 2 cigars/week \par             Elevated PTH (167 2/20/18, 25 OH vit D low) \par             , borderline elev calcium Hx kidney stone\par            .\par            47 yo with decreased hearing visits for diabetes, low vitamin D,\par            early hyperparathyroidism.\par            Just returned from a trip to [place].\par            Did a lot of fishing.\par            Knows the [employer] [place].\par            Forget his blood glucose meter today. \par            .\par            lab tests from\par            6/29/2018 (last visit) included\par            25 OH vit D 19.9\par            ionized calcium 1.34 (1.12 - 1/30)\par            total calcium 10.0\par            glucose 142 mg/dl \par            HbA1c 6.2 % (had been 6.3 in February)\par            phos 3.5\par            .\par            Good eye report from January - Dr. Farr.\par            .\par            September saw Dr. Tariq Dhillon for R neck mass (infectious)\par            .\par            Medications include\par            Plavix 75 mg\par            glyburide-metformin 2l5 500 BID\par            lisinopril 5 mg daily\par            metoprolol 25 mg BID\par            simvastain 40 mg daily\par            .\par            FBS today 186 after pizza last night. \par            Rest of the Verio readings are mostly reasonable.\par            .\par            Plan: Updated labs today.\par            Discussed Freestyle Lily 14 sensors and reader and he will [facility].\par            Take OTC vitamin D 1000 iu daily.\par            Bring his wife to next visiti in March\par            ==\par

## 2023-03-14 ENCOUNTER — APPOINTMENT (OUTPATIENT)
Dept: SURGERY | Facility: CLINIC | Age: 53
End: 2023-03-14
Payer: COMMERCIAL

## 2023-03-14 VITALS
OXYGEN SATURATION: 97 % | SYSTOLIC BLOOD PRESSURE: 120 MMHG | DIASTOLIC BLOOD PRESSURE: 71 MMHG | HEART RATE: 62 BPM | TEMPERATURE: 98.2 F

## 2023-03-14 DIAGNOSIS — K40.90 UNILATERAL INGUINAL HERNIA, W/OUT OBSTRUCTION OR GANGRENE, NOT SPECIFIED AS RECURRENT: ICD-10-CM

## 2023-03-14 DIAGNOSIS — K40.30 UNILATERAL INGUINAL HERNIA, WITH OBSTRUCTION, W/OUT GANGRENE, NOT SPECIFIED AS RECURRENT: ICD-10-CM

## 2023-03-14 PROCEDURE — 99024 POSTOP FOLLOW-UP VISIT: CPT

## 2023-03-14 NOTE — ASSESSMENT
[FreeTextEntry1] : Patient presents today for follow-up after bilateral lap inguinal hernia repair.  Patient states he was doing well first 3 days after surgery however then all of a sudden he developed black and blue in the operative field and down his scrotum.  He at present he complains of mild discomfort in his testicles bilaterally however this is markedly improved.  He also complains of a burning sense in his left side of his lower abdomen.  This is also improving.  He has had no fever chills or sweats since discharge.\par \par Physical examination patient examined erect and supine position.  He has no evidence of recurrent hernia in either groin.  The incisions of healed nicely.  He still ecchymotic at various places in his lower abdomen and in his groin as well as scrotum.  Scrotum is within normal limits as well as the testicle.  There is tenderness along both testicles.  The remainder the examination is unremarkable.\par \par Status post bilateral lap inguinal hernia repair patient doing well surgically the ecchymosis is normal.  Patient was reassured regarding that and I suspect the burning sensation as well as the mild discomfort in his testicle will continue to improve.  Patient will return here now to see me on a as needed basis.  (Staples removed)

## 2023-03-20 ENCOUNTER — RESULT REVIEW (OUTPATIENT)
Age: 53
End: 2023-03-20

## 2023-03-20 LAB
25(OH)D3 SERPL-MCNC: 15.8 NG/ML
ANION GAP SERPL CALC-SCNC: 11 MMOL/L
BUN SERPL-MCNC: 15 MG/DL
CA-I SERPL-SCNC: 5.2 MG/DL
CALCIUM ?TM UR-MCNC: 31.5 MG/DL
CALCIUM SERPL-MCNC: 10.3 MG/DL
CALCIUM SERPL-MCNC: 10.3 MG/DL
CALCIUM/CREAT UR: 0.4 RATIO
CHLORIDE SERPL-SCNC: 104 MMOL/L
CO2 SERPL-SCNC: 27 MMOL/L
CREAT SERPL-MCNC: 0.76 MG/DL
CREAT SPEC-SCNC: 71 MG/DL
CREAT SPEC-SCNC: 72 MG/DL
EGFR: 107 ML/MIN/1.73M2
ESTIMATED AVERAGE GLUCOSE: 148 MG/DL
GLUCOSE SERPL-MCNC: 154 MG/DL
HBA1C MFR BLD HPLC: 6.8 %
MICROALBUMIN 24H UR DL<=1MG/L-MCNC: 1.3 MG/DL
MICROALBUMIN/CREAT 24H UR-RTO: 18 MG/G
PARATHYROID HORMONE INTACT: 68 PG/ML
PHOSPHATE SERPL-MCNC: 3.2 MG/DL
POTASSIUM SERPL-SCNC: 3.9 MMOL/L
SODIUM SERPL-SCNC: 143 MMOL/L
TSH SERPL-ACNC: 1.52 UIU/ML

## 2023-04-12 ENCOUNTER — NON-APPOINTMENT (OUTPATIENT)
Age: 53
End: 2023-04-12

## 2023-05-04 ENCOUNTER — APPOINTMENT (OUTPATIENT)
Dept: FAMILY MEDICINE | Facility: CLINIC | Age: 53
End: 2023-05-04
Payer: MEDICARE

## 2023-05-04 ENCOUNTER — LABORATORY RESULT (OUTPATIENT)
Age: 53
End: 2023-05-04

## 2023-05-04 VITALS
RESPIRATION RATE: 18 BRPM | SYSTOLIC BLOOD PRESSURE: 110 MMHG | OXYGEN SATURATION: 97 % | BODY MASS INDEX: 33.6 KG/M2 | WEIGHT: 240 LBS | TEMPERATURE: 98 F | HEIGHT: 71 IN | DIASTOLIC BLOOD PRESSURE: 80 MMHG | HEART RATE: 80 BPM

## 2023-05-04 DIAGNOSIS — Z01.818 ENCOUNTER FOR OTHER PREPROCEDURAL EXAMINATION: ICD-10-CM

## 2023-05-04 PROCEDURE — 99214 OFFICE O/P EST MOD 30 MIN: CPT | Mod: 25

## 2023-05-04 PROCEDURE — 36415 COLL VENOUS BLD VENIPUNCTURE: CPT

## 2023-05-05 ENCOUNTER — NON-APPOINTMENT (OUTPATIENT)
Age: 53
End: 2023-05-05

## 2023-05-05 LAB
ALBUMIN SERPL ELPH-MCNC: 4.6 G/DL
ALP BLD-CCNC: 70 U/L
ALT SERPL-CCNC: 12 U/L
ANION GAP SERPL CALC-SCNC: 12 MMOL/L
APPEARANCE: CLEAR
APTT BLD: 30.8 SEC
AST SERPL-CCNC: 23 U/L
BASOPHILS # BLD AUTO: 0.05 K/UL
BASOPHILS NFR BLD AUTO: 0.7 %
BILIRUB SERPL-MCNC: 0.5 MG/DL
BILIRUBIN URINE: NEGATIVE
BLOOD URINE: ABNORMAL
BUN SERPL-MCNC: 17 MG/DL
CALCIUM SERPL-MCNC: 10.5 MG/DL
CHLORIDE SERPL-SCNC: 104 MMOL/L
CO2 SERPL-SCNC: 25 MMOL/L
COLOR: NORMAL
CREAT SERPL-MCNC: 0.84 MG/DL
EGFR: 104 ML/MIN/1.73M2
EOSINOPHIL # BLD AUTO: 0.06 K/UL
EOSINOPHIL NFR BLD AUTO: 0.9 %
GLUCOSE QUALITATIVE U: NEGATIVE MG/DL
GLUCOSE SERPL-MCNC: 187 MG/DL
HCT VFR BLD CALC: 45.7 %
HGB BLD-MCNC: 15.4 G/DL
IMM GRANULOCYTES NFR BLD AUTO: 0.4 %
INR PPP: 0.99 RATIO
KETONES URINE: ABNORMAL MG/DL
LEUKOCYTE ESTERASE URINE: ABNORMAL
LYMPHOCYTES # BLD AUTO: 1.76 K/UL
LYMPHOCYTES NFR BLD AUTO: 26 %
MAN DIFF?: NORMAL
MCHC RBC-ENTMCNC: 31.6 PG
MCHC RBC-ENTMCNC: 33.7 GM/DL
MCV RBC AUTO: 93.8 FL
MONOCYTES # BLD AUTO: 0.51 K/UL
MONOCYTES NFR BLD AUTO: 7.5 %
NEUTROPHILS # BLD AUTO: 4.37 K/UL
NEUTROPHILS NFR BLD AUTO: 64.5 %
NITRITE URINE: NEGATIVE
PH URINE: 5.5
PLATELET # BLD AUTO: 203 K/UL
POTASSIUM SERPL-SCNC: 4.8 MMOL/L
PROT SERPL-MCNC: 7.1 G/DL
PROTEIN URINE: 30 MG/DL
PT BLD: 11.7 SEC
RBC # BLD: 4.87 M/UL
RBC # FLD: 13.1 %
SODIUM SERPL-SCNC: 142 MMOL/L
SPECIFIC GRAVITY URINE: 1.02
UROBILINOGEN URINE: 1 MG/DL
WBC # FLD AUTO: 6.78 K/UL

## 2023-05-09 ENCOUNTER — NON-APPOINTMENT (OUTPATIENT)
Age: 53
End: 2023-05-09

## 2023-05-23 ENCOUNTER — TRANSCRIPTION ENCOUNTER (OUTPATIENT)
Age: 53
End: 2023-05-23

## 2023-08-20 ENCOUNTER — RX RENEWAL (OUTPATIENT)
Age: 53
End: 2023-08-20

## 2023-08-27 ENCOUNTER — RX RENEWAL (OUTPATIENT)
Age: 53
End: 2023-08-27

## 2023-08-29 ENCOUNTER — RX RENEWAL (OUTPATIENT)
Age: 53
End: 2023-08-29

## 2023-10-09 ENCOUNTER — APPOINTMENT (OUTPATIENT)
Dept: ENDOCRINOLOGY | Facility: CLINIC | Age: 53
End: 2023-10-09
Payer: COMMERCIAL

## 2023-10-09 VITALS
BODY MASS INDEX: 33.04 KG/M2 | DIASTOLIC BLOOD PRESSURE: 84 MMHG | OXYGEN SATURATION: 98 % | HEIGHT: 71 IN | HEART RATE: 56 BPM | SYSTOLIC BLOOD PRESSURE: 120 MMHG | WEIGHT: 236 LBS

## 2023-10-09 PROCEDURE — 99214 OFFICE O/P EST MOD 30 MIN: CPT

## 2023-10-09 RX ORDER — BLOOD SUGAR DIAGNOSTIC
STRIP MISCELLANEOUS
Qty: 100 | Refills: 3 | Status: ACTIVE | COMMUNITY
Start: 2020-03-05 | End: 1900-01-01

## 2023-10-09 RX ORDER — GLYBURIDE AND METFORMIN HYDROCHLORIDE 1.25; 25 MG/1; MG/1
1.25-25 TABLET, FILM COATED ORAL
Qty: 90 | Refills: 3 | Status: ACTIVE | COMMUNITY
Start: 1900-01-01 | End: 1900-01-01

## 2023-10-15 LAB
25(OH)D3 SERPL-MCNC: 17.5 NG/ML
ANION GAP SERPL CALC-SCNC: 9 MMOL/L
BUN SERPL-MCNC: 10 MG/DL
CALCIUM SERPL-MCNC: 9.9 MG/DL
CHLORIDE SERPL-SCNC: 102 MMOL/L
CO2 SERPL-SCNC: 26 MMOL/L
CREAT SERPL-MCNC: 0.6 MG/DL
EGFR: 114 ML/MIN/1.73M2
ESTIMATED AVERAGE GLUCOSE: 143 MG/DL
GLUCOSE SERPL-MCNC: 147 MG/DL
HBA1C MFR BLD HPLC: 6.6 %
PHOSPHATE SERPL-MCNC: 2.7 MG/DL
POTASSIUM SERPL-SCNC: 4.2 MMOL/L
SODIUM SERPL-SCNC: 137 MMOL/L

## 2024-01-16 ENCOUNTER — APPOINTMENT (OUTPATIENT)
Dept: ENDOCRINOLOGY | Facility: CLINIC | Age: 54
End: 2024-01-16
Payer: COMMERCIAL

## 2024-01-16 VITALS
HEART RATE: 56 BPM | HEIGHT: 71 IN | BODY MASS INDEX: 33.32 KG/M2 | DIASTOLIC BLOOD PRESSURE: 70 MMHG | SYSTOLIC BLOOD PRESSURE: 120 MMHG | OXYGEN SATURATION: 97 % | WEIGHT: 238 LBS

## 2024-01-16 DIAGNOSIS — E11.9 TYPE 2 DIABETES MELLITUS W/OUT COMPLICATIONS: ICD-10-CM

## 2024-01-16 DIAGNOSIS — Z86.39 PERSONAL HISTORY OF OTHER ENDOCRINE, NUTRITIONAL AND METABOLIC DISEASE: ICD-10-CM

## 2024-01-16 PROCEDURE — G2211 COMPLEX E/M VISIT ADD ON: CPT

## 2024-01-16 PROCEDURE — 36415 COLL VENOUS BLD VENIPUNCTURE: CPT

## 2024-01-16 PROCEDURE — 99215 OFFICE O/P EST HI 40 MIN: CPT | Mod: 25

## 2024-01-16 RX ORDER — GLYBURIDE AND METFORMIN HYDROCHLORIDE 1.25; 25 MG/1; MG/1
1.25-25 TABLET, FILM COATED ORAL
Qty: 90 | Refills: 3 | Status: ACTIVE | COMMUNITY
Start: 2020-02-18 | End: 1900-01-01

## 2024-01-16 RX ORDER — BLOOD SUGAR DIAGNOSTIC
STRIP MISCELLANEOUS
Qty: 200 | Refills: 3 | Status: ACTIVE | COMMUNITY
Start: 2022-10-18 | End: 1900-01-01

## 2024-01-16 NOTE — HISTORY OF PRESENT ILLNESS
[FreeTextEntry1] : Jan 16, 2024   in person  PCP:  Dr. Maggie Pena            .             Cardiology: Dr. Bo Mendez p [phone]    f [phone] XXX             and then Dr. Regan Hamilton p   f: [phone]  on [facility]. XXX             now cardiologist is  Dr. Jaspreet Crowder at [facility]             Eyes: Dr. Amado Curran p       f               Pod: Dr. Mitesh Vera on [address]  p   sees every 6 weeks - has ingrown toenail x 10+ yrs              ENT: Dr. Mekhi Dhillon 9/20/18 R neck mass - removed  at [facility] - apparently benign                Neurol:  Dr. Coleman Ball at [facility] for PD     Dx after leg fx             Surg:  Dr. Chava Madison - hernia repair 2/2022            .            CC: Diabetes ~ 2015 A1c 7.1-7.6 % (did not tolerate glipizide)   11/12/2018 A1c 6.9%  10/19/19 A1c 6.4 %                                               2/2020: Ac 6.2 %  10/2023  A1c 6.6 %                                    He tried Lily, did not like it - caused pain when he flexed his muscles.               (born with decreased hearing)              MI: 2/4/2016 - presented with sweating and weakness -> SJR             -smokes 2 cigars/week              Elevated PTH (167 2/20/18, 25 OH vit D low = 17.9 11/12/2018  )            6/2022 Ca 10.2, PTH 94 25 OH vit D 24.3                         10/2023 25 OH vit D              , borderline elev calcium Hx kidney stone  54 yo with diabetes since at least 2015.  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    History of Low vitamin D, borderline elevation of calcium, history of kidney stone, decreased hearing, smoker, followed by cardiology.    Taking glyburide-metformin 1. mg in PM    He is considering a move to [place] within the next few months.    Monitors his sugar with an  AccuChek Guide meter -   : : Constitutional:  Alert, well nourished, healthy appearance, no acute distress  Eyes:  No proptosis, no stare Thyroid: Normal to palpation Pulmonary:  No respiratory distress, no accessory muscle use; normal rate and effort Cardiac:  Normal rate Vascular:  Endocrine:  No stigmata of Cushings Syndrome Musculoskeletal:  Normal gait, no involuntary movements Neurology:  No tremors Affect/Mood/Psych:  Oriented x 3; normal affect, normal insight/judgement, normal mood  . Impression:  Available data from the AccuChek Guide meter reviewed and generally shows good BS control.   Highest BS noted was 240 mg/dl.    Plan:  Take daily vit D 1000 iu as he is slowly transitioning  from secondary to primary hyperparathyroidism. Keep to lower carb diet.  He declines CGM  James Hellerman, MD      [address] [facility] [address]     [address]       [place], [place]   Endocrinology       cell: [phone]    office:  [phone]  fax:  [phone] ROV in August.   Once he is settled, he will let me know where to send his medical record.     Oct 09, 2023     in person   - planning move to [facility]  PCP:  Dr. Maggie Pena              [address] [place] [address]   [phone] Fax             .             Cardiology: Dr. Bo Mendez p [phone]    f [phone] XXX             and then Dr. Regan Hamilton p   f: [phone]  on [facility]. XXX             now cardioloist  Dr. Jaspreet Crowder at [facility]             Eyes: Dr. Amado Curran p       f               Pod: Dr. Mitesh Vera on [facility]  p   sees every 6 weeks - has ingrown toenail x 10+ yrs              ENT: Dr. Mekhi Dhillon 9/20/18 R neck mass - removed - apparently benign                Neurol:  Dr. Coleman Ball at [facility] for PD     Dx after leg fx             Surg:  Dr. Chava Madison - hernia repair 2/2022            .            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    History of Low vitamin D, borderline elevation of calcium, history of kidney stone, decreased hearing, smoker, followed by cardiology.    Taking glyburide-metformin 1. mg in PM    Has decided to move to [place] [facility].   Today he brings his AccuChek Guide meter - sugars appear to be in good range.    : : Constitutional:  Alert, well nourished, healthy appearance, no acute distress  Eyes:  No proptosis, no stare Thyroid: Pulmonary:  No respiratory distress, no accessory muscle use; normal rate and effort Cardiac:  Normal rate Vascular:  Endocrine:  No stigmata of Cushings Syndrome Musculoskeletal:  Normal gait, no involuntary movements Neurology:  No tremors Affect/Mood/Psych:  Oriented x 3; normal affect, normal insight/judgement, normal mood  .  Impression:  Diabetes has been stable. Goes for eye exams. Feet also stable.  Plan; Same Rx.       Mar 13, 2023    in person      (has iPhone)    PCP:  Dr. Meng Ro: [facility]             [address], [place] [address]   [phone] Fax             .             Cardiology: Dr. Bo Mendez p [phone]    f [phone] XXX             and then Dr. Regan Hamilton p   f: [phone]  on [facility]. XXX             now cardioloist  Dr. Jaspreet Crowder at [facility]             Eyes: Dr. Amado Curran p       f               Pod: Dr. Mitesh Vera on [address]  p   sees every 6 weeks - has ingrown toenail x 10+ yrs              ENT: Dr. Mekhi Dhillon 9/20/18 R neck mass - removed - apparently benign                Neurol:  Dr. Coleman Ball at [facility] for PD     Dx after leg fx             Surg:  Dr. Chava Madison - hernia repair 2/2022            .            CC: Diabetes ~ 2015 A1c 7.1-7.6 % (did not tolerate glipizide)   11/12/2018 A1c 6.9%  10/19/19 A1c 6.4 %                                               2/2020: Ac 6.2 %             (born with decreased hearing)              MI: 2/4/2016 - presented with sweating and weakness -> SJR             -smokes 2 cigars/week              Elevated PTH (167 2/20/18, 25 OH vit D low = 17.9 11/12/2018  )            6/2022 Ca 10.2, PTH 94 25 OH vit D 24.3             , borderline elev calcium Hx kidney stone  53 yo with diabetes since at least 2015.  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    History of Low vitamin D, borderline elevation of calcium, history of kidney stone, decreased hearing, smoker, followed by cardiology.    Recent hernia repair by Dr. Chava Madison.  Today he brings his AccuChek Guide meter  Sugars frequently go into the mid 200's and even over 300 mg/dl     Medications include:    Jardiance 10 mg daily - per Dr. Malin - but stopped  b/o nocturia.   glyburide-metformin 1. daily  simvastatin    Most recent labs from 2/24/2010 include  calcium 10.9 **  : : Constitutional:  Alert, well nourished, healthy appearance, no acute distress  Eyes:  No proptosis, no stare Thyroid: Pulmonary:  No respiratory distress, no accessory muscle use; normal rate and effort Cardiac:  Normal rate Vascular:  Endocrine:  No stigmata of Cushing's Syndrome Musculoskeletal:  Normal gait, no involuntary movements Neurology:  No tremors Affect/Mood/Psych:  Oriented x 3; normal affect, normal insight/judgement, normal mood  .  Impression:  Hyperparathyorid - will check US thyroid, US kidney, bone density Labs today Will need CGM in future. Will  need parathyroid surgery in future.    Oct 10, 2022  in person   PCP:  Dr. Meng Ro: [facility]             [address], [place] [address]   [phone] Fax             .             Cardiology: Dr. Bo bowman [phone]    f [phone] XXX             and then Dr. Regan Hamilton p   f: [phone]  on [facility]. XXX             now cardioloist  Dr. Jaspreet Crowder at [facility]             Eyes: Dr. Amado Curran p       f               Pod: Dr. Mitesh Vera on [address]  p   sees every 6 weeks - has ingrown toenail x 10+ yrs              ENT: Dr. Mekhi Dhillon 9/20/18 R neck mass - removed - apparently benign                Neurol:  Dr. Coleman Ball at [facility]     Dx after leg fx            .            CC: Diabetes ~ 2015 A1c 7.1-7.6 % (did not tolerate glipizide)   11/12/2018 A1c 6.9%  10/19/19 A1c 6.4 %                                               2/2020: Ac 6.2 %             (born with decreased hearing)              MI: 2/4/2016 - presented with sweating and weakness -> SJR             -smokes 2 cigars/week              Elevated PTH (167 2/20/18, 25 OH vit D low = 17.9 11/12/2018  )            6/2022 Ca 10.2, PTH 94 25 OH vit D 24.3             , borderline elev calcium Hx kidney stone  Has today, Carlos day, off.  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 Treated 3 weeks ago and treated at [facility] on [address] and Rx'd with abx x 1 week.     Jardiance 10 mg daily - per Dr. Malin glyburide-metformin 1. daily  simvastatin    Most recent 6/10/22 A1c 6.1%      : : Constitutional:  Alert, well nourished, healthy appearance, no acute distress  Eyes:  No proptosis, no stare Thyroid: Pulmonary:  No respiratory distress, no accessory muscle use; normal rate and effort Cardiac:  Normal rate Vascular:  Endocrine:  No stigmata of Cushing's Syndrome Musculoskeletal:  Normal gait, no involuntary movements Neurology:  No tremors Affect/Mood/Psych:  Oriented x 3; normal affect, normal insight/judgement, normal mood  .  Imp/Plan:  In view of excellent fingerstick BS and A1c, as well as recent UTI, will discontinue Jardiance. Updated labs today. See Opthalmology at least once a year.    Fx of tibia when fell off later; Hx kidney stone, Hx low vit D, elevated PTH Monitor calcium, PTH, 25 OH vit D level.    In the future, 24 hour urine study   Ayaz 10, 2022     in person accompanied by his wife - [phone]   ****Elev Ca***  PCP: Dr. Meng Ro: [facility]             [address] [address]   [phone] Fax             .             Cardiology: Dr. Bo Mendez p [phone]    f [phone] XXX             and now Dr. Regan Hamilton p   f: [phone]  on [facility]. XXX             now Dr. Jaspreet Crowder at [facility]             Eyes: Dr. Amado Curran p       f               Pod: Dr. Mitesh Vera on [address]  p   sees every 6 weeks             ENT: Dr. Mekhi Dhillon 9/20/18 R neck mass - removed - apparently benign                Neurol:  Dr. Coleman Ball at [facility]     Dx after leg fx            .            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 His wife was very ill with Covid end of Feb-early March.   He brings in his Accu-Chek Shantel  On Jardiance 10 mg daily  glyburide-metformin 1/ daily  simvastatin    Impression:   Fingerstick  BS are very good. Plan:  Same Rx and        Nov 10, 2020   in person  PCP: Dr. Meng Ro: [facility]              [address], [place] [address]   [phone] Fax             .             Cardiology: Dr. Bo Mendez p [phone]    f [phone]             and now Dr. Regan Hamilton p   f: [phone]  on [facility].              Eyes: Dr. Amado Curran p       f               Pod: Dr. Mitesh Vera on [address]  p   sees every 6 weeks             ENT: Dr. Mekhi Dhillon 9/20/18 R neck mass - removed - apparently benign              .            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 His wife was very ill with Covid end of Feb-early March. He brings in the AccuChek Shantel .  Highest sugar 165  History of low vitamin D - taking 400 iu BID per his recollection    Most recent A1c:  Oct 2019  6.4,  Feb 2020 6.2 Reports he is taking vitamin D      Examination:  Constitutional:  Alert, well nourished, healthy appearance, no acute distress  Eyes:  No proptosis, no lid lag Thyroid: Pulmonary:  No respiratory distress, no accessory muscle use; normal rate and effort Cardiac:  Normal rate Vascular:  Endocrine:  No stigmata of Cushing's Syndrome Musculoskeletal:  Normal gait, no involuntary movements Neurology:  No tremors Affect/Mood/Psych:  Oriented x 3; normal affect, normal insight/judgement, normal mood  .     Impression:  Doing very well. Denies hypoglycemia. No recent kidney stones.  Plan:  Same Rx and ROV 3-4 months Continue Jardiance 10 mg daaily  glyburide-metformin 1. in PM   May 06, 2020    Telephone Visit, including his wife - her cell  (VideoChat failed)  PCP: Dr. Taqueria Contreras: [facility]              [address] [address]   [phone] Fax             .             Cardiology: Dr. Bo Mendez p [phone]    f [phone]             and now Dr. Regan Hamilton p   f: [phone]  on [facility].              Eyes: Dr. Amado Curran p       f               Pod: Dr. Mitesh Vera on [address]  p   sees every 6 weeks             ENT: Dr. Mekhi Dhillon 9/20/18 R neck mass - removed - apparently benign              .            CC: Diabetes ~ 2015 A1c 7.1-7.6 % (did not tolerate glipizide)   11/12/2018 A1c 6.9%  10/19/19 A1c 6.4 %             (born with decreased hearing)              MI: 2/4/2016 - presented with sweating and weakness -> SJR             -smokes 2 cigars/week              Elevated PTH (167 2/20/18, 25 OH vit D low = 17.9 11/12/2018  )              , borderline elev calcium Hx kidney stone  50 yo visits for diabetes and hypercalcemia.   Feb 18 calcium 11.0 (had been 10.1 in October) Previous history of low vit D and elevated PTH.   Likely has hyperparathyroidism.  He reports BS in good range on Jardiance and  glyburide-metformin and needs refills. Advised to return to office within 3 months.   His wife tested positive for Covid 19 x 2, now awaits abs.   No one else in 5 person household positive.  He says BS in good range. [facility] and glyburide 1.25 - metformin from mail order.  No hypoglycemia.   He will return in 3 months.     Feb 18, 2020  PCP: Dr. Taqueria Contreras: [facility]              [address], [place] [address]   [phone] Fax             .             Cardiology: Dr. Bo Mendez p [phone]    f [phone]             and now Dr. Regan Hamilton p   f: [phone]  on [facility].              Eyes: Dr. Amado Curran p       f               Pod: Dr. Mitesh Vera on [address]  p   sees every 6 weeks             ENT: Dr. Mekhi Dhillon 9/20/18 R neck mass - removed - apparently benign              .            CC: Diabetes ~ 2015 A1c 7.1-7.6 % (did not tolerate glipizide)   11/12/2018 A1c 6.9%  10/19/19 A1c 6.4 %             (born with decreased hearing)              MI: 2/4/2016 - presented with sweating and weakness -> SJR             -smokes 2 cigars/week              Elevated PTH (167 2/20/18, 25 OH vit D low = 17.9 11/12/2018  )              , borderline elev calcium Hx kidney stone  50 yo visits for diabetes.    He is taking daily OTC vitamin D Jardiance  10 mg daily  (lost 10 lbs after starting it). glyburide-metformin 2.5/500 once a day in AM              .  He brings in the OneTouch Verio   FBS run around 120  Impression:  A1c trajectory has been one of improvement over time. Will check A1c again today. He might benefit from increasing the glyb-met to BID ROV in June.    Oct 14, 2019  PCP: Dr. Meng Ro: [address] [address]   [phone] Fax             .             Cardiology: Dr. Bo Mendez p [phone]    f [phone]             and now Dr. Regan Hamilton p   f: [phone]  on [facility].              Eyes: Dr. Amado Curran p       f               Pod: Dr. Mitesh Vera on [address]  p   sees every 6 weeks             ENT: Dr. Mekhi Dhillon 9/20/18 R neck mass             .            CC: Diabetes ~ 2015 A1c 7.1-7.6 % (did not tolerate glipizide)   11/12/2018 A1c 6.9%             (born with decreased hearing)              MI: 2/4/2016 - presented with sweating and weakness -> SJR             -smokes 2 cigars/week              Elevated PTH (167 2/20/18, 25 OH vit D low = 17.9 11/12/2018  )              , borderline elev calcium Hx kidney stone            . "I had too much to eat yesterday, bar-b-cue.... My wife would like me to get the Freestyle Lily to monitor the dog...." He previously tried the Lily and returned it as he did not like it. Briings in the One Touch Verio  FBS today 180 mg/dl yesterday  day before FBS 80 July 27 5 pm 188 mg/dl     Taking Jardiance. Not sure of other medications. Recent labs by Dr. Contreras.     May 21, 2019  PCP: Dr. Meng Ro: [address] [address]             [phone] Fax             .             Cardiology: Dr. Bo Mendez p [phone]             f [phone]             and now Dr. Regan Hamilton p               f: [phone]             on [facility].              Eyes: Dr. Amado Curran p              f               Pod: Dr. Mitesh Vera on [address]  p             sees every 6 weeks             ENT: Dr. Mekhi Dhillon 9/20/18 R neck mass             .            CC: Diabetes ~ 2015 A1c 7.1-7.6 % (did not tolerate glipizide)             (born with decreased hearing)              MI: 2/4/2016 - presented with sweating and weakness -> SJR             -smokes 2 cigars/week              Elevated PTH (167 2/20/18, 25 OH vit D low)              , borderline elev calcium Hx kidney stone            . Brings his One Touch Verio.  He checks mostly fasting BS a few times a week, a few after noon BS (much higher). I gave him CGM:  Freestyle Lily 14 which he was able to use for 11 of 14 days.    He generally checked his sugar  once, occasionally twice a day even with the Lily, so there are gaps in the graphical information  On the 11th day of 14 the Lily got knocked off and he said he does not want to use it going into the future.   However, the Lily nicely documented marked excursion in his blood sugar after meals, a time missed on the fingerstick data.    August - Sept 4 he will be on a "boatel" in [place].    ROV here in October.     He reports that Dr. Malin would like him to try an SGLT-2 inhibitor, Jardiance. Requested and he has been instructed in its use, warned of potential side effects and importance of extra fluid intake.          Previous notes from [employer] appended below.  November 12, 2018            .            PCP: Dr. Meng Ro: [facility]              [address], [place] [address]             [phone] Fax             .             Cardiology: Dr. Bo Mendez p [phone]             f [phone]             and now Dr. Regan Hamilton p               f: [phone]             on [facility].              Eyes: Dr. Amado Curran p              f               Pod: Dr. Mitesh Vera on [address]  p             sees every 6 weeks             ENT: Dr. Mekhi Dhillon 9/20/18 R neck mass             .            CC: Diabetes ~ 2015 A1c 7.1-7.6 % (did not claude glipizide)             (born with decreased hearing)              MI: 2/4/2016 - presented with sweating and weakness -> SJR             -smokes 2 cigars/week              Elevated PTH (167 2/20/18, 25 OH vit D low)              , borderline elev calcium Hx kidney stone            .            47 yo with decreased hearing visits for diabetes, low vitamin D,            early hyperparathyroidism.            Just returned from a trip to [place].            Did a lot of fishing.            Knows the [place] [employer].            Forget his blood glucose meter today.             .            lab tests from            6/29/2018 (last visit) included            25 OH vit D 19.9            ionized calcium 1.34 (1.12 - 1/30)            total calcium 10.0            glucose 142 mg/dl             HbA1c 6.2 % (had been 6.3 in February)            phos 3.5            .            Good eye report from January - Dr. Farr.            .            September saw Dr. Tariq Dhillon for R neck mass (infectious)            .            Medications include            Plavix 75 mg            glyburide-metformin 2l5 500 BID            lisinopril 5 mg daily            metoprolol 25 mg BID            simvastain 40 mg daily            .            FBS today 186 after pizza last night.             Rest of the Verio readings are mostly reasonable.            .            Plan: Updated labs today.            Discussed Freestyle Lily 14 sensors and reader and [facility].            Take OTC vitamin D 1000 iu daily.            Bring his wife to next visiti in March            ==

## 2024-02-04 LAB
ANION GAP SERPL CALC-SCNC: 10 MMOL/L
BUN SERPL-MCNC: 14 MG/DL
CALCIUM SERPL-MCNC: 10.5 MG/DL
CHLORIDE SERPL-SCNC: 103 MMOL/L
CO2 SERPL-SCNC: 29 MMOL/L
CREAT SERPL-MCNC: 0.76 MG/DL
EGFR: 106 ML/MIN/1.73M2
ESTIMATED AVERAGE GLUCOSE: 140 MG/DL
GLUCOSE SERPL-MCNC: 146 MG/DL
HBA1C MFR BLD HPLC: 6.5 %
POTASSIUM SERPL-SCNC: 4.6 MMOL/L
SODIUM SERPL-SCNC: 141 MMOL/L

## 2024-10-23 RX ORDER — METFORMIN HYDROCHLORIDE 500 MG/1
500 TABLET, COATED ORAL
Qty: 45 | Refills: 0 | Status: ACTIVE | COMMUNITY
Start: 2024-10-23 | End: 1900-01-01

## 2024-10-23 RX ORDER — GLYBURIDE 1.25 MG/1
1.25 TABLET ORAL DAILY
Qty: 90 | Refills: 0 | Status: ACTIVE | COMMUNITY
Start: 2024-10-23 | End: 1900-01-01